# Patient Record
Sex: FEMALE | Race: WHITE | NOT HISPANIC OR LATINO | Employment: UNEMPLOYED | URBAN - METROPOLITAN AREA
[De-identification: names, ages, dates, MRNs, and addresses within clinical notes are randomized per-mention and may not be internally consistent; named-entity substitution may affect disease eponyms.]

---

## 2017-04-18 ENCOUNTER — HISTORICAL (OUTPATIENT)
Dept: ADMINISTRATIVE | Facility: HOSPITAL | Age: 72
End: 2017-04-18

## 2017-04-18 LAB
ALBUMIN SERPL-MCNC: 4.4 G/DL (ref 3.1–4.7)
ALP SERPL-CCNC: 64 IU/L (ref 40–104)
ALT (SGPT): 18 IU/L (ref 3–33)
AST SERPL-CCNC: 33 IU/L (ref 10–40)
BASOPHILS NFR BLD: 0 K/UL (ref 0–0.2)
BASOPHILS NFR BLD: 0.7 %
BILIRUB SERPL-MCNC: 0.7 MG/DL (ref 0.3–1)
BUN SERPL-MCNC: 14 MG/DL (ref 8–20)
CALCIUM SERPL-MCNC: 10 MG/DL (ref 7.7–10.4)
CHLORIDE: 104 MMOL/L (ref 98–110)
CK SERPL-CCNC: 42 IU/L (ref 13–135)
CO2 SERPL-SCNC: 26.1 MMOL/L (ref 22.8–31.6)
CREATININE: 0.96 MG/DL (ref 0.6–1.4)
CRP SERPL-MCNC: 0.44 MG/DL (ref 0–1.4)
EOSINOPHIL NFR BLD: 0 %
EOSINOPHIL NFR BLD: 0 K/UL (ref 0–0.7)
ERYTHROCYTE [DISTWIDTH] IN BLOOD BY AUTOMATED COUNT: 13.2 % (ref 11.7–14.9)
GLUCOSE: 193 MG/DL (ref 70–99)
GRAN #: 4.7 K/UL (ref 1.4–6.5)
GRAN%: 83.4 %
HCT VFR BLD AUTO: 41.7 % (ref 36–48)
HGB BLD-MCNC: 13.3 G/DL (ref 12–15)
IMMATURE GRANS (ABS): 0 K/UL (ref 0–1)
IMMATURE GRANULOCYTES: 0.5 %
LYMPH #: 0.8 K/UL (ref 1.2–3.4)
LYMPH%: 14.3 %
MCH RBC QN AUTO: 28.4 PG (ref 25–35)
MCHC RBC AUTO-ENTMCNC: 31.9 G/DL (ref 31–36)
MCV RBC AUTO: 88.9 FL (ref 79–98)
MONO #: 0.1 K/UL (ref 0.1–0.6)
MONO%: 1.1 %
NUCLEATED RBCS: 0 %
PLATELET # BLD AUTO: 250 K/UL (ref 140–440)
PMV BLD AUTO: 10.5 FL (ref 8.8–12.7)
POTASSIUM SERPL-SCNC: 4.6 MMOL/L (ref 3.5–5)
PROT SERPL-MCNC: 7.2 G/DL (ref 6–8.2)
RBC # BLD AUTO: 4.69 M/UL (ref 3.5–5.5)
SODIUM: 138 MMOL/L (ref 134–144)
TSH SERPL DL<=0.005 MIU/L-ACNC: 2.43 ULU/ML (ref 0.3–5.6)
WBC # BLD AUTO: 5.6 K/UL (ref 5–10)

## 2017-06-19 PROBLEM — Z87.39 HISTORY OF POLYMYALGIA RHEUMATICA: Status: ACTIVE | Noted: 2017-06-19

## 2017-07-11 ENCOUNTER — OFFICE VISIT (OUTPATIENT)
Dept: RHEUMATOLOGY | Facility: CLINIC | Age: 72
End: 2017-07-11
Payer: MEDICARE

## 2017-07-11 VITALS
HEIGHT: 66 IN | BODY MASS INDEX: 24.91 KG/M2 | DIASTOLIC BLOOD PRESSURE: 60 MMHG | WEIGHT: 155 LBS | SYSTOLIC BLOOD PRESSURE: 128 MMHG

## 2017-07-11 DIAGNOSIS — M62.81 MUSCLE WEAKNESS: Primary | ICD-10-CM

## 2017-07-11 LAB
CK SERPL-CCNC: 42 IU/L (ref 13–135)
CRP SERPL-MCNC: 0.41 MG/DL (ref 0–1.4)

## 2017-07-11 PROCEDURE — 1125F AMNT PAIN NOTED PAIN PRSNT: CPT | Mod: ,,, | Performed by: INTERNAL MEDICINE

## 2017-07-11 PROCEDURE — 1159F MED LIST DOCD IN RCRD: CPT | Mod: ,,, | Performed by: INTERNAL MEDICINE

## 2017-07-11 PROCEDURE — 99213 OFFICE O/P EST LOW 20 MIN: CPT | Mod: ,,, | Performed by: INTERNAL MEDICINE

## 2017-07-11 NOTE — PROGRESS NOTES
[facility] RHEUMATOLOGY            PROGRESS NOTE      Subjective:       Patient ID:   NAME: Lyn Rapp : 1945     72 y.o. female    Referring Doc: No ref. provider found  Other Physicians:    Chief Complaint:  polymyalgia rheumatica (follow-up- pt. sts, very lathargic, weak, no energy, having trouble getting up out of the chair when she sits)      History of Present Illness:     Patient returns today for a regularly scheduled follow-up visit for Polymyalgia rheumatica      The patient was treated for this condition and has been off steroids for many many months.  She denies any muscle pain but finds she has trouble getting out of chairs because of weakness primarily in the lower extremities ( proximally.)  She doesn't perceive any muscle weakness in the upper extremities. No paresthesias mild-to-moderate fatigue. No headaches, scalp tenderness or visual complaints.  No rashes, no joint swelling, no fevers, no significant weight loss  No gastrointestinal complaints            ROS:   GEN:  No  fever, night sweats . weight is stable   + fatigue  SKIN: no rashes, no bruising, no ulcerations, no Raynaud's  HEENT: no HA's, No visual changes, no mucosal ulcers, no sicca symptoms,  CV:   no CP, SOB, PND, OLIVEROS, no orthopnea, no palpitations  PULM: normal with no SOB, cough, hemoptysis, sputum or pleuritic pain  GI:  no abdominal pain, nausea, vomiting, constipation, diarrhea, melanotic stools, BRBPR, hematemesis, no dysphagia  :   no dysuria  NEURO: no paresthesias, headaches, visual disturbances,+ muscle weakness  MUSCULOSKELETAL:no joint swelling, prolonged AM stiffness, no back pain, no muscle pain  Allergies:  Review of patient's allergies indicates:   Allergen Reactions    Ciprofloxacin Hives and Swelling     Swelling in throat    Bactrim [sulfamethoxazole-trimethoprim] Rash    Sulfur Hives    Toprol xl [metoprolol succinate] Other (See Comments)     Profuse sweating       Medications:    Current  "Outpatient Prescriptions:     aspirin (ECOTRIN) 81 MG EC tablet, Take 81 mg by mouth once daily., Disp: , Rfl:     CALCIUM CARBONATE (CALCIUM 500 ORAL), Take 1,200 mg by mouth once daily., Disp: , Rfl:     cholecalciferol, vitamin D3, (VITAMIN D3) 2,000 unit Cap, Take 1 capsule by mouth once daily., Disp: , Rfl:     cloNIDine (CATAPRES) 0.2 MG tablet, TAKE ONE TABLET BY MOUTH THREE TIMES DAILY, Disp: 270 tablet, Rfl: 1    escitalopram oxalate (LEXAPRO) 20 MG tablet, TAKE ONE TABLET BY MOUTH ONCE DAILY, Disp: 90 tablet, Rfl: 3    fluticasone (FLONASE) 50 mcg/actuation nasal spray, 1 spray by Nasal route as needed. , Disp: , Rfl: 4    furosemide (LASIX) 20 MG tablet, TAKE 1 TABLET(20 MG) BY MOUTH EVERY DAY (Patient taking differently: TAKE 1 TABLET(20 MG) BY MOUTH EVERY DAY prn), Disp: 30 tablet, Rfl: 0    PMHx/PSHx Updates:    Objective:     Vitals:  Blood pressure 128/60, height 5' 6" (1.676 m), weight 70.3 kg (155 lb).    Physical Examination:   GEN: no apparent distress, comfortable; AAOx3  SKIN: no rashes,no ulceration, no Raynaud's, no petechiae, no SQ nodules,  HEAD: normal.No scalp tenderness, no temporal artery tenderness or nodularities  EYES: no pallor, no icterus  ENT:  ,no mucosal dryness or ulcerations  NECK: no masses, thyroid normal, trachea midline, no LAD/LN's, supple  CV: RRR with no murmur; l S1 and S2 reg. ,no gallop no rubs,   CHEST: Normal respiratory effort; CTAB; normal breath sounds; no wheeze or crackles  ABDOM: nontender and nondistended; soft; no masses; no rebound/guarding  MUSC/Skeletal: ROM normal; no crepitus; joints without synovitis,  no deformities  No joint swelling or tenderness of PIP, MCP, wrist, elbow, shoulder, or knee joints  EXTREM: no clubbing, cyanosis, no edema,normal  pulses   NEURO: grossly intact; motor WNL; AAOx3; , PSYCH: normal mood, affect and behavior  LYMPH: normal cervical, supraclavicular          Labs:   Lab Results   Component Value Date    WBC 5.6 " 04/18/2017    HGB 13.3 04/18/2017    HCT 41.7 04/18/2017    MCV 88.9 04/18/2017     04/18/2017    CMP  @LASTLAB(NA,K,CL,CO2,GLU,BUN,Creatinine,Calcium,PROT,Albumin,Bilitot,Alkphos,AST,ALT,CRP,ESR,RF,CCP,ANYA,SSA,CPK,uric acid) )@  I have reviewed all available lab results and radiology reports.    Radiology/Diagnostic Studies:        Assessment/Plan:   (1) 72 y.o. female with diagnosis of Polymyalgia rheumatica which seems to be stable.  Last inflammatory markers were done 3 months ago and they were within normal range. Thyroid function studies were also normal.  Patient is going to physical therapy trying to build up her strength but she doesn't feel she is making significant progress.    PLAN: Will repeat muscle enzyme tests, inflammatory  Markers; if they are normal she might benefit from seeing a neurologist and possibly do an EMG.  Follow-up in a few months              Discussion:     I have explained all of the above in detail and the patient understands all of the current recommendation(s). I have answered all questions to the best of my ability and to their complete satisfaction.       The patient is to continue with the current management plan         RTC in 3 months        Electronically signed by Jigar Martínez MD

## 2017-07-13 LAB
ALDOLASE SERPL-CCNC: 4.4 U/L (ref 3.3–10.3)
ANA SER-ACNC: NEGATIVE

## 2018-04-27 ENCOUNTER — HOSPITAL ENCOUNTER (INPATIENT)
Facility: HOSPITAL | Age: 73
LOS: 5 days | Discharge: HOME | DRG: 064 | End: 2018-05-02
Attending: EMERGENCY MEDICINE | Admitting: PSYCHIATRY & NEUROLOGY
Payer: MEDICARE

## 2018-04-27 DIAGNOSIS — I48.91 ATRIAL FIBRILLATION WITH RAPID VENTRICULAR RESPONSE: ICD-10-CM

## 2018-04-27 DIAGNOSIS — I48.0 AF (PAROXYSMAL ATRIAL FIBRILLATION): ICD-10-CM

## 2018-04-27 DIAGNOSIS — I63.412 EMBOLIC STROKE INVOLVING LEFT MIDDLE CEREBRAL ARTERY: Primary | ICD-10-CM

## 2018-04-27 DIAGNOSIS — I63.9 STROKE: ICD-10-CM

## 2018-04-27 PROBLEM — Z92.82 S/P ADMN TPA IN DIFF FAC W/N LAST 24 HR BEF ADM TO CRNT FAC: Status: ACTIVE | Noted: 2018-04-27

## 2018-04-27 LAB
BASOPHILS # BLD AUTO: 0.08 K/UL
BASOPHILS NFR BLD: 0.7 %
CREAT SERPL-MCNC: 0.8 MG/DL (ref 0.5–1.4)
DIFFERENTIAL METHOD: ABNORMAL
EOSINOPHIL # BLD AUTO: 0.1 K/UL
EOSINOPHIL NFR BLD: 1.1 %
ERYTHROCYTE [DISTWIDTH] IN BLOOD BY AUTOMATED COUNT: 14.3 %
HCT VFR BLD AUTO: 37.5 %
HGB BLD-MCNC: 12.2 G/DL
IMM GRANULOCYTES # BLD AUTO: 0.1 K/UL
IMM GRANULOCYTES NFR BLD AUTO: 0.9 %
LYMPHOCYTES # BLD AUTO: 3 K/UL
LYMPHOCYTES NFR BLD: 27.8 %
MCH RBC QN AUTO: 28.6 PG
MCHC RBC AUTO-ENTMCNC: 32.5 G/DL
MCV RBC AUTO: 88 FL
MONOCYTES # BLD AUTO: 1 K/UL
MONOCYTES NFR BLD: 9.2 %
NEUTROPHILS # BLD AUTO: 6.6 K/UL
NEUTROPHILS NFR BLD: 60.3 %
NRBC BLD-RTO: 0 /100 WBC
PLATELET # BLD AUTO: 325 K/UL
PMV BLD AUTO: 10 FL
POC PTINR: 1.1 (ref 0.9–1.2)
POC PTWBT: 12.9 SEC (ref 9.7–14.3)
RBC # BLD AUTO: 4.26 M/UL
SAMPLE: NORMAL
SAMPLE: NORMAL
WBC # BLD AUTO: 10.93 K/UL

## 2018-04-27 PROCEDURE — 80053 COMPREHEN METABOLIC PANEL: CPT | Mod: 91

## 2018-04-27 PROCEDURE — 99233 SBSQ HOSP IP/OBS HIGH 50: CPT | Mod: GC,,, | Performed by: PSYCHIATRY & NEUROLOGY

## 2018-04-27 PROCEDURE — 82565 ASSAY OF CREATININE: CPT

## 2018-04-27 PROCEDURE — 80061 LIPID PANEL: CPT

## 2018-04-27 PROCEDURE — 85730 THROMBOPLASTIN TIME PARTIAL: CPT | Mod: 91

## 2018-04-27 PROCEDURE — 93010 ELECTROCARDIOGRAM REPORT: CPT | Mod: ,,, | Performed by: INTERNAL MEDICINE

## 2018-04-27 PROCEDURE — 99900035 HC TECH TIME PER 15 MIN (STAT)

## 2018-04-27 PROCEDURE — 85025 COMPLETE CBC W/AUTO DIFF WBC: CPT | Mod: 91

## 2018-04-27 PROCEDURE — 99291 CRITICAL CARE FIRST HOUR: CPT | Mod: ,,, | Performed by: EMERGENCY MEDICINE

## 2018-04-27 PROCEDURE — 86850 RBC ANTIBODY SCREEN: CPT

## 2018-04-27 PROCEDURE — 12000002 HC ACUTE/MED SURGE SEMI-PRIVATE ROOM

## 2018-04-27 PROCEDURE — 96374 THER/PROPH/DIAG INJ IV PUSH: CPT

## 2018-04-27 PROCEDURE — 93005 ELECTROCARDIOGRAM TRACING: CPT

## 2018-04-27 PROCEDURE — 85610 PROTHROMBIN TIME: CPT

## 2018-04-27 PROCEDURE — 85610 PROTHROMBIN TIME: CPT | Mod: 91

## 2018-04-27 PROCEDURE — 99291 CRITICAL CARE FIRST HOUR: CPT | Mod: 25

## 2018-04-27 PROCEDURE — 84443 ASSAY THYROID STIM HORMONE: CPT

## 2018-04-27 RX ORDER — LANOLIN ALCOHOL/MO/W.PET/CERES
800 CREAM (GRAM) TOPICAL
Status: DISCONTINUED | OUTPATIENT
Start: 2018-04-28 | End: 2018-04-30

## 2018-04-27 RX ORDER — HYDRALAZINE HYDROCHLORIDE 20 MG/ML
10 INJECTION INTRAMUSCULAR; INTRAVENOUS EVERY 4 HOURS PRN
Status: DISCONTINUED | OUTPATIENT
Start: 2018-04-28 | End: 2018-05-02 | Stop reason: HOSPADM

## 2018-04-27 RX ORDER — LABETALOL HYDROCHLORIDE 5 MG/ML
INJECTION, SOLUTION INTRAVENOUS
Status: DISPENSED
Start: 2018-04-27 | End: 2018-04-28

## 2018-04-27 RX ORDER — POTASSIUM CHLORIDE 20 MEQ/15ML
40 SOLUTION ORAL
Status: DISCONTINUED | OUTPATIENT
Start: 2018-04-28 | End: 2018-04-30

## 2018-04-27 RX ORDER — SODIUM CHLORIDE 0.9 % (FLUSH) 0.9 %
3 SYRINGE (ML) INJECTION EVERY 8 HOURS
Status: DISCONTINUED | OUTPATIENT
Start: 2018-04-28 | End: 2018-05-02 | Stop reason: HOSPADM

## 2018-04-27 RX ORDER — LABETALOL HYDROCHLORIDE 5 MG/ML
10 INJECTION, SOLUTION INTRAVENOUS EVERY 30 MIN PRN
Status: DISCONTINUED | OUTPATIENT
Start: 2018-04-28 | End: 2018-05-02 | Stop reason: HOSPADM

## 2018-04-27 RX ORDER — INSULIN ASPART 100 [IU]/ML
0-5 INJECTION, SOLUTION INTRAVENOUS; SUBCUTANEOUS EVERY 6 HOURS PRN
Status: DISCONTINUED | OUTPATIENT
Start: 2018-04-28 | End: 2018-05-02 | Stop reason: HOSPADM

## 2018-04-27 RX ORDER — POLYETHYLENE GLYCOL 3350 17 G/17G
17 POWDER, FOR SOLUTION ORAL DAILY PRN
Status: DISCONTINUED | OUTPATIENT
Start: 2018-04-28 | End: 2018-05-02 | Stop reason: HOSPADM

## 2018-04-27 RX ORDER — SODIUM,POTASSIUM PHOSPHATES 280-250MG
2 POWDER IN PACKET (EA) ORAL
Status: DISCONTINUED | OUTPATIENT
Start: 2018-04-28 | End: 2018-04-30

## 2018-04-27 RX ORDER — ACETAMINOPHEN 325 MG/1
650 TABLET ORAL EVERY 6 HOURS PRN
Status: DISCONTINUED | OUTPATIENT
Start: 2018-04-28 | End: 2018-04-28

## 2018-04-27 RX ORDER — GLUCAGON 1 MG
1 KIT INJECTION
Status: DISCONTINUED | OUTPATIENT
Start: 2018-04-28 | End: 2018-05-02 | Stop reason: HOSPADM

## 2018-04-27 RX ORDER — POTASSIUM CHLORIDE 20 MEQ/15ML
60 SOLUTION ORAL
Status: DISCONTINUED | OUTPATIENT
Start: 2018-04-28 | End: 2018-04-30

## 2018-04-27 RX ORDER — ATORVASTATIN CALCIUM 20 MG/1
40 TABLET, FILM COATED ORAL DAILY
Status: DISCONTINUED | OUTPATIENT
Start: 2018-04-28 | End: 2018-05-02

## 2018-04-27 RX ADMIN — LABETALOL HYDROCHLORIDE 10 MG: 5 INJECTION, SOLUTION INTRAVENOUS at 11:04

## 2018-04-27 RX ADMIN — IOHEXOL 100 ML: 350 INJECTION, SOLUTION INTRAVENOUS at 11:04

## 2018-04-28 LAB
ABO + RH BLD: NORMAL
ALBUMIN SERPL BCP-MCNC: 2.9 G/DL
ALBUMIN SERPL BCP-MCNC: 3.2 G/DL
ALP SERPL-CCNC: 53 U/L
ALP SERPL-CCNC: 57 U/L
ALT SERPL W/O P-5'-P-CCNC: 11 U/L
ALT SERPL W/O P-5'-P-CCNC: 13 U/L
ANION GAP SERPL CALC-SCNC: 10 MMOL/L
ANION GAP SERPL CALC-SCNC: 12 MMOL/L
APTT BLDCRRT: <21 SEC
AST SERPL-CCNC: 20 U/L
AST SERPL-CCNC: 22 U/L
BASOPHILS # BLD AUTO: 0.07 K/UL
BASOPHILS NFR BLD: 0.8 %
BILIRUB SERPL-MCNC: 0.3 MG/DL
BILIRUB SERPL-MCNC: 0.4 MG/DL
BLD GP AB SCN CELLS X3 SERPL QL: NORMAL
BUN SERPL-MCNC: 15 MG/DL
BUN SERPL-MCNC: 15 MG/DL
CALCIUM SERPL-MCNC: 9.1 MG/DL
CALCIUM SERPL-MCNC: 9.4 MG/DL
CHLORIDE SERPL-SCNC: 107 MMOL/L
CHLORIDE SERPL-SCNC: 107 MMOL/L
CHOLEST SERPL-MCNC: 124 MG/DL
CHOLEST/HDLC SERPL: 2.9 {RATIO}
CO2 SERPL-SCNC: 19 MMOL/L
CO2 SERPL-SCNC: 20 MMOL/L
CREAT SERPL-MCNC: 0.8 MG/DL
CREAT SERPL-MCNC: 0.9 MG/DL
DIFFERENTIAL METHOD: NORMAL
EOSINOPHIL # BLD AUTO: 0.1 K/UL
EOSINOPHIL NFR BLD: 1 %
ERYTHROCYTE [DISTWIDTH] IN BLOOD BY AUTOMATED COUNT: 14.4 %
EST. GFR  (AFRICAN AMERICAN): >60 ML/MIN/1.73 M^2
EST. GFR  (AFRICAN AMERICAN): >60 ML/MIN/1.73 M^2
EST. GFR  (NON AFRICAN AMERICAN): >60 ML/MIN/1.73 M^2
EST. GFR  (NON AFRICAN AMERICAN): >60 ML/MIN/1.73 M^2
ESTIMATED AVG GLUCOSE: 105 MG/DL
ESTIMATED PA SYSTOLIC PRESSURE: 27.8
GLUCOSE SERPL-MCNC: 108 MG/DL
GLUCOSE SERPL-MCNC: 131 MG/DL
HBA1C MFR BLD HPLC: 5.3 %
HCT VFR BLD AUTO: 37.9 %
HDLC SERPL-MCNC: 43 MG/DL
HDLC SERPL: 34.7 %
HGB BLD-MCNC: 12.3 G/DL
IMM GRANULOCYTES # BLD AUTO: 0.04 K/UL
IMM GRANULOCYTES NFR BLD AUTO: 0.5 %
INR PPP: 1.2
LDLC SERPL CALC-MCNC: 58.6 MG/DL
LYMPHOCYTES # BLD AUTO: 2.6 K/UL
LYMPHOCYTES NFR BLD: 28.8 %
MAGNESIUM SERPL-MCNC: 2 MG/DL
MCH RBC QN AUTO: 28.1 PG
MCHC RBC AUTO-ENTMCNC: 32.5 G/DL
MCV RBC AUTO: 87 FL
MITRAL VALVE MOBILITY: NORMAL
MITRAL VALVE REGURGITATION: NORMAL
MONOCYTES # BLD AUTO: 0.7 K/UL
MONOCYTES NFR BLD: 8.4 %
NEUTROPHILS # BLD AUTO: 5.4 K/UL
NEUTROPHILS NFR BLD: 60.5 %
NONHDLC SERPL-MCNC: 81 MG/DL
NRBC BLD-RTO: 0 /100 WBC
PHOSPHATE SERPL-MCNC: 3.3 MG/DL
PLATELET # BLD AUTO: 310 K/UL
PMV BLD AUTO: 10 FL
POCT GLUCOSE: 102 MG/DL (ref 70–110)
POCT GLUCOSE: 102 MG/DL (ref 70–110)
POTASSIUM SERPL-SCNC: 3.9 MMOL/L
POTASSIUM SERPL-SCNC: 3.9 MMOL/L
PROT SERPL-MCNC: 5.7 G/DL
PROT SERPL-MCNC: 6.2 G/DL
PROTHROMBIN TIME: 12.1 SEC
RBC # BLD AUTO: 4.38 M/UL
RETIRED EF AND QEF - SEE NOTES: 48 (ref 55–65)
SODIUM SERPL-SCNC: 137 MMOL/L
SODIUM SERPL-SCNC: 138 MMOL/L
TRICUSPID VALVE REGURGITATION: NORMAL
TRIGL SERPL-MCNC: 112 MG/DL
TSH SERPL DL<=0.005 MIU/L-ACNC: 3.75 UIU/ML
WBC # BLD AUTO: 8.86 K/UL

## 2018-04-28 PROCEDURE — A4216 STERILE WATER/SALINE, 10 ML: HCPCS | Performed by: STUDENT IN AN ORGANIZED HEALTH CARE EDUCATION/TRAINING PROGRAM

## 2018-04-28 PROCEDURE — 92523 SPEECH SOUND LANG COMPREHEN: CPT

## 2018-04-28 PROCEDURE — 25000003 PHARM REV CODE 250: Performed by: PHYSICIAN ASSISTANT

## 2018-04-28 PROCEDURE — 84100 ASSAY OF PHOSPHORUS: CPT

## 2018-04-28 PROCEDURE — 25500020 PHARM REV CODE 255: Performed by: EMERGENCY MEDICINE

## 2018-04-28 PROCEDURE — 25000003 PHARM REV CODE 250: Performed by: ANESTHESIOLOGY

## 2018-04-28 PROCEDURE — G8997 SWALLOW GOAL STATUS: HCPCS | Mod: CH

## 2018-04-28 PROCEDURE — G8980 MOBILITY D/C STATUS: HCPCS | Mod: CH

## 2018-04-28 PROCEDURE — 99233 SBSQ HOSP IP/OBS HIGH 50: CPT | Mod: GC,,, | Performed by: PSYCHIATRY & NEUROLOGY

## 2018-04-28 PROCEDURE — 80053 COMPREHEN METABOLIC PANEL: CPT

## 2018-04-28 PROCEDURE — 93306 TTE W/DOPPLER COMPLETE: CPT

## 2018-04-28 PROCEDURE — 20000000 HC ICU ROOM

## 2018-04-28 PROCEDURE — 85025 COMPLETE CBC W/AUTO DIFF WBC: CPT

## 2018-04-28 PROCEDURE — G8978 MOBILITY CURRENT STATUS: HCPCS | Mod: CH

## 2018-04-28 PROCEDURE — G8998 SWALLOW D/C STATUS: HCPCS | Mod: CH

## 2018-04-28 PROCEDURE — 93306 TTE W/DOPPLER COMPLETE: CPT | Mod: 26,,, | Performed by: INTERNAL MEDICINE

## 2018-04-28 PROCEDURE — 25000003 PHARM REV CODE 250: Performed by: STUDENT IN AN ORGANIZED HEALTH CARE EDUCATION/TRAINING PROGRAM

## 2018-04-28 PROCEDURE — 83036 HEMOGLOBIN GLYCOSYLATED A1C: CPT

## 2018-04-28 PROCEDURE — 92610 EVALUATE SWALLOWING FUNCTION: CPT

## 2018-04-28 PROCEDURE — 97162 PT EVAL MOD COMPLEX 30 MIN: CPT

## 2018-04-28 PROCEDURE — 97802 MEDICAL NUTRITION INDIV IN: CPT

## 2018-04-28 PROCEDURE — G8979 MOBILITY GOAL STATUS: HCPCS | Mod: CH

## 2018-04-28 PROCEDURE — G8996 SWALLOW CURRENT STATUS: HCPCS | Mod: CH

## 2018-04-28 PROCEDURE — 99223 1ST HOSP IP/OBS HIGH 75: CPT | Mod: ,,, | Performed by: ANESTHESIOLOGY

## 2018-04-28 PROCEDURE — 83735 ASSAY OF MAGNESIUM: CPT

## 2018-04-28 PROCEDURE — 63600175 PHARM REV CODE 636 W HCPCS: Performed by: STUDENT IN AN ORGANIZED HEALTH CARE EDUCATION/TRAINING PROGRAM

## 2018-04-28 PROCEDURE — 94761 N-INVAS EAR/PLS OXIMETRY MLT: CPT

## 2018-04-28 RX ORDER — ACETAMINOPHEN 325 MG/1
650 TABLET ORAL EVERY 6 HOURS PRN
Status: DISCONTINUED | OUTPATIENT
Start: 2018-04-28 | End: 2018-04-30

## 2018-04-28 RX ORDER — ONDANSETRON 2 MG/ML
4 INJECTION INTRAMUSCULAR; INTRAVENOUS EVERY 8 HOURS PRN
Status: DISCONTINUED | OUTPATIENT
Start: 2018-04-28 | End: 2018-04-29

## 2018-04-28 RX ORDER — SODIUM CHLORIDE 9 MG/ML
INJECTION, SOLUTION INTRAVENOUS CONTINUOUS
Status: DISCONTINUED | OUTPATIENT
Start: 2018-04-28 | End: 2018-04-29

## 2018-04-28 RX ORDER — ATENOLOL 25 MG/1
25 TABLET ORAL DAILY
Status: DISCONTINUED | OUTPATIENT
Start: 2018-04-28 | End: 2018-04-30

## 2018-04-28 RX ADMIN — ACETAMINOPHEN 650 MG: 325 TABLET ORAL at 07:04

## 2018-04-28 RX ADMIN — HYDRALAZINE HYDROCHLORIDE 10 MG: 20 INJECTION INTRAMUSCULAR; INTRAVENOUS at 12:04

## 2018-04-28 RX ADMIN — SODIUM CHLORIDE: 0.9 INJECTION, SOLUTION INTRAVENOUS at 03:04

## 2018-04-28 RX ADMIN — ATENOLOL 25 MG: 25 TABLET ORAL at 12:04

## 2018-04-28 RX ADMIN — Medication 3 ML: at 01:04

## 2018-04-28 RX ADMIN — ACETAMINOPHEN 650 MG: 325 TABLET ORAL at 02:04

## 2018-04-28 RX ADMIN — SODIUM CHLORIDE: 0.9 INJECTION, SOLUTION INTRAVENOUS at 10:04

## 2018-04-28 NOTE — PROGRESS NOTES
Patient arrived to [facility] from [facility] to [facility] ED to [facility].    Type of Stroke: Embolic LMCA    TPA start time and end time: 4.27.18 at 2202.    Patients current symptoms include: expressive aphasia on certain words. Initial GCS 15 and NIH 0.     ANJEL Oscar notified patient arrived form ED.

## 2018-04-28 NOTE — PT/OT/SLP EVAL
"Speech Language Pathology Evaluation  Cognitive/Bedside Swallow    Patient Name:  Lyn Rapp   MRN:  [id number]  Admitting Diagnosis: <principal problem not specified>    Recommendations:                  General Recommendations:  Speech/language therapy and Cognitive-linguistic therapy  Diet recommendations:  Regular, Thin   Aspiration Precautions: Standard aspiration precautions   General Precautions: Standard, aphasia, aspiration, fall  Communication strategies:  provide increased time to answer    History:     Past Medical History:   Diagnosis Date    Acid reflux     Coronary artery disease due to calcified coronary lesion 7/13/2016    nonobstructive    Hypertension     Melanoma 1994    Melanoma of back     Mitral valve prolapse        Past Surgical History:   Procedure Laterality Date    APPENDECTOMY      APPENDECTOMY      BREAST BIOPSY Right     benign excisional biopsy    CARDIAC CATHETERIZATION      CHOLECYSTECTOMY      COLONOSCOPY      HYSTERECTOMY  1982    MANDIBLE SURGERY      OOPHORECTOMY Bilateral 1982    TONSILLECTOMY      TUBAL LIGATION         Social History: Patient lives with . 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  Mild difficulty noted with immediate verbal recall; however, suspect in part due to language difficulty.  Pt recalled up to 4 digits and 3 out of 4 words.   Problem Solving - Answers to hypothetical questions were accurate on 75% of trials.  Pt completed categorization with 50% ind'ly/75% given cuing.  Pt was able to compare/contrast two given items.  She sequenced " 3 out of 4 words ind'ly and 4/4 given repetition of words.  Again, language deficits likely contributing to difficulty with problem solving tasks at this time.      Receptive Language:   Comprehension:   WFL for complex commands and complex y/n .    Pragmatics:    WFL    Expressive Language:  Verbal:    Mild deficits noted.  Pt responded to spontaneous speech questions with 100%.  She stated months of the year ind'ly.  Responsive naming was completed with 100% and pt named common objects with 100% accuracy.  Ms. Rapp listed 12 items in one minute on a word fluency task, when 15-20 is considered wnl.  Word finding difficulty was noted at times in conversation and with more complex language tasks. At times difficulty was c/b mild hesitation or dysfluency.  Given time, pt generally able to elicit word.    Motor Speech:  No significant dysarthria noted.    Voice:   WFL    Visual-Spatial:  To be tested.    Reading:   WFL for sign in room.    Written Expression:   WFL at sentence level.     Oral Musculature Evaluation  · Oral Musculature: WFL  · Dentition: present and adequate  · Mucosal Quality: good  · Mandibular Strength and Mobility: WFL  · Oral Labial Strength and Mobility: WFL  · Lingual Strength and Mobility: WFL  · Volitional Cough: good  · Voice Prior to PO Intake: clear    Bedside Swallow Eval:   Consistencies Assessed:  · Thin liquids (cup/straw - 4 oz)  · Solids (crackers)     Oral Phase:   · WFL    Pharyngeal Phase:   · no overt clinical signs/symptoms of aspiration    Treatment: Education provided regarding ST role, language deficits, aspiration precautions and plan of care.  Word finding strategies were reviewed.  All questions were addressed.    Pt leaving ED for floor upon completion of evaluation.  Results reviewed with ED nurse.    Assessment:     Lyn Rapp is a 73 y.o. female with an SLP diagnosis of Aphasia and Cognitive-Linguistic Impairment.    Goals:    SLP Goals        Problem: SLP  Goal    Goal Priority Disciplines Outcome   SLP Goal     SLP    Description:  Speech Language Pathology Goals  Goals expected to be met by 5/5  1. Pt will participate in further assessment of visual-spatial skills.   2. Pt will complete moderate level word finding tasks with 80% accuracy, to improve expressive language skills.   3.  Pt will list 15 items in one minute on a word fluency task, to improve word fluency/thought organization skills.   4.  Pt will complete categorization tasks with 90% accuracy, to improve expressive language skills.   5.  Further assess higher level cognitive skills including math/time/basic money/deduction to determine if further intervention is warranted.  6. Pt will tolerate regular diet with thin liquids and no overt s/s of aspiration.                        Plan:     · Patient to be seen:  5 x/week   · Plan of Care expires:  05/27/18  · Plan of Care reviewed with:  patient, family   · SLP Follow-Up:  Yes       Discharge recommendations:  Discharge Facility/Level Of Care Needs: outpatient speech therapy     Time Tracking:     SLP Treatment Date:   04/28/18  Speech Start Time:  0950  Speech Stop Time:  1020     Speech Total Time (min):  30 min    Billable Minutes: Eval 20  and Eval Swallow and Oral Function 10    SAMANTHA Rizvi, CCC-SLP  Speech Language Pathologist  [phone]  4/28/2018

## 2018-04-28 NOTE — ASSESSMENT & PLAN NOTE
73 y.o. female with significant past medical history of GERD, HTN, CAD, Afib, and diverticulitis presented to hospital as a transfer from [facility] ED for evaluation of L MCA stroke symptoms.  Patient received tPA.  CTA MP negative for LVO. Source is likely thromboembolic as patient has A Fib and stopped taking coumadin about 3 weeks ago.    MRI ordered for today. Patient has mild expressive aphasia. Right UE and LE strength has improved    Antithrombotics for secondary stroke prevention: Antiplatelets: None: Hold all Antithrombotics x 24 hours after IV t-PA administration    Statins for secondary stroke prevention and hyperlipidemia, if present:   Statins: Atorvastatin- 40 mg daily    Aggressive risk factor modification: HTN, A-Fib     Rehab efforts: PT/OT/SLP to evaluate and treat    Diagnostics ordered/pending: MRI head without contrast to assess brain parenchyma, TTE to assess cardiac function/status     VTE prophylaxis: None: Reason for No Pharmacological VTE Prophylaxis: Mechanical prophylaxis: Place SCDs, hold anticoagulation for 24hrs post tPA administration.      BP parameters: Infarct: Post tPA, SBP <180

## 2018-04-28 NOTE — SUBJECTIVE & OBJECTIVE
Neurologic Chief Complaint: aphasia, right hemiparesis    Subjective:     Interval History: Patient is seen for follow-up neurological assessment and treatment recommendations:     Aphasia improved overnight. Patient with mild residual expressive. Improvement in right sided strength. HR in 120s. Holding anti-coagulation due to recent tPA    HPI, Past Medical, Family, and Social History remains the same as documented in the initial encounter.     Review of Systems   Constitutional: Negative for chills and fever.   HENT: Negative for drooling and trouble swallowing.    Eyes: Negative for redness and visual disturbance.   Respiratory: Negative for cough and shortness of breath.    Cardiovascular: Negative for chest pain and palpitations.   Gastrointestinal: Negative for abdominal pain, nausea and vomiting.   Endocrine: Negative for cold intolerance and heat intolerance.   Genitourinary: Negative for dysuria and frequency.   Musculoskeletal: Negative for neck pain and neck stiffness.   Skin: Negative for rash and wound.   Allergic/Immunologic: Negative for environmental allergies and food allergies.   Neurological: Positive for facial asymmetry, speech difficulty, weakness and headaches. Negative for dizziness and numbness.   Hematological: Negative for adenopathy. Does not bruise/bleed easily.   Psychiatric/Behavioral: Negative for agitation.     Scheduled Meds:   atenolol  25 mg Oral Daily    atorvastatin  40 mg Oral Daily    sodium chloride 0.9%  3 mL Intravenous Q8H     Continuous Infusions:   sodium chloride 0.9% 75 mL/hr at 04/28/18 1400     PRN Meds:acetaminophen, dextrose 50%, glucagon (human recombinant), hydrALAZINE, insulin aspart U-100, labetalol, magnesium oxide, magnesium oxide, polyethylene glycol, potassium chloride 10%, potassium chloride 10%, potassium chloride 10%, potassium, sodium phosphates, potassium, sodium phosphates, potassium, sodium phosphates, promethazine (PHENERGAN) IVPB    Objective:      Vital Signs (Most Recent):  Temp: 98.3 °F (36.8 °C) (04/28/18 1102)  Pulse: 107 (04/28/18 1402)  Resp: 19 (04/28/18 1402)  BP: (!) 142/68 (04/28/18 1402)  SpO2: 95 % (04/28/18 1402)  BP Location: Left arm    Vital Signs Range (Last 24H):  Temp:  [98.2 °F (36.8 °C)-98.9 °F (37.2 °C)]   Pulse:  []   Resp:  [14-28]   BP: (114-189)/()   SpO2:  [94 %-97 %]   BP Location: Left arm    Physical Exam   Constitutional: She is oriented to person, place, and time. She appears well-developed and well-nourished. No distress.   HENT:   Head: Normocephalic and atraumatic.   Right Ear: External ear normal.   Left Ear: External ear normal.   Nose: Nose normal.   Mouth/Throat: Oropharynx is clear and moist. No oropharyngeal exudate.   Eyes: Conjunctivae and EOM are normal. Pupils are equal, round, and reactive to light. Right eye exhibits no discharge. Left eye exhibits no discharge. No scleral icterus.   Neck: Normal range of motion. Neck supple. No thyromegaly present.   Cardiovascular: Normal rate, regular rhythm and normal heart sounds.    No murmur heard.  Pulmonary/Chest: Effort normal and breath sounds normal. No respiratory distress. She has no wheezes. She has no rhonchi. She has no rales.   Abdominal: Soft. Bowel sounds are normal. She exhibits no distension. There is no hepatosplenomegaly. There is no tenderness.   Musculoskeletal: She exhibits no edema.   Lymphadenopathy:     She has no cervical adenopathy.   Neurological: She is alert and oriented to person, place, and time.   Skin: Skin is warm and dry. Capillary refill takes less than 2 seconds. She is not diaphoretic.   Psychiatric: She has a normal mood and affect.   Nursing note and vitals reviewed.      Neurological Exam:   LOC: alert  Attention Span: Good   Language: Expressive aphasia  Articulation: No dysarthria  Orientation: Person, Place, Time   Visual Fields: Full  EOM (CN III, IV, VI): Full/intact  Pupils (CN II, III): PERRL  Facial Sensation  (CN V): Normal  Facial Movement (CN VII): Symmetric facial expression    Motor: Arm left  Normal 5/5  Leg left  Normal 5/5  Arm right  Normal 5/5  Leg right Paresis: 4/5  Cebellar: No evidence of appendicular or axial ataxia  Sensation: Intact to light touch, temperature and vibration  Tone: Normal tone throughout    Laboratory:  CMP:   Recent Labs  Lab 04/28/18  0358   CALCIUM 9.4   ALBUMIN 3.2*   PROT 6.2      K 3.9   CO2 19*      BUN 15   CREATININE 0.9   ALKPHOS 57   ALT 13   AST 22   BILITOT 0.4     CBC:   Recent Labs  Lab 04/28/18  0358   WBC 8.86   RBC 4.38   HGB 12.3   HCT 37.9      MCV 87   MCH 28.1   MCHC 32.5     Coagulation:   Recent Labs  Lab 04/27/18  2328   INR 1.2   APTT <21.0     TSH:   Recent Labs  Lab 04/27/18  2328   TSH 3.750       Diagnostic Results     Brain Imaging   CTH 4/27  No acute intracranial abnormality. Chronic changes     CTA head/neck 4/27  No acute abnormality. No high-grade stenosis or major vessel occlusion.    MRI brain pending     Vessel Imaging   CTA head/neck 4/27  No acute abnormality. No high-grade stenosis or major vessel occlusion.    Cardiac Imaging   2D echo 4/28    1 - Mildly depressed left ventricular systolic function (EF 45-50%).     2 - Concentric remodeling.     3 - Wall motion abnormalities.     4 - Low normal right ventricular systolic function .     5 - Mild to moderate mitral regurgitation.     6 - Trivial tricuspid regurgitation.     7 - The estimated PA systolic pressure is 28 mmHg.

## 2018-04-28 NOTE — ASSESSMENT & PLAN NOTE
- Stroke risk factor.  Patient newly diagnosed w/Afib.  Reportedly stopped taking coumadin about 3 weeks ago due to concerns about bleeding  - source of stroke was likely thromboembolic   - Recommend rate control with beta blockers (currently on atenolol. Consider metoprolol)  - Hold anticoagulation for 24 hrs post tPA administration.  Will need to resume coumadin at discharge

## 2018-04-28 NOTE — HPI
Patient is a 73 y.o. female with significant past medical history of GERD, HTN, CAD, Afib, and diverticulitis presented to hospital as a transfer from [facility] ED for evaluation of L MCA stroke symptoms.  Patient was LKN 1930.  She acutely developed aphasia, with right sided weakness.  CTH at OSH negative for acute findings.  Telestroke consult performed by Dr. Tinoco.  tPA administered.  Transferred to [facility] for further evaluation/possible intervention.  On arrival patient aphasic.  She has no other complaints besides the urge to urinate.  The patient was taken to CT for CTA MP.  Asia Noble and Hussain reviewed images as acquired.  No LVO.  Patient not a candidate for IR intervention.  Will be admitted to [facility] for close monitoring post tPA administration.

## 2018-04-28 NOTE — HOSPITAL COURSE
Lyn Rapp is a 73 y.o. female with PMHx of GERD, HTN, CAD, Afib, and diverticulosis who presented to hospital as a transfer from [facility] ED for evaluation of L MCA stroke symptoms. CTH at OSH negative for acute findings.Telestroke consult performed by Dr. Tinoco and tPA was administered.  Patient was not eligible for a thrombectomy because CTA multiphase revealed no LVO. She was admitted to neuro ICU for further care post tPA. MRI revealed small L temporal parietal lobe infarct. Echo revealed an EF of 48%, LV wall motion abnormalities, and severe LA enlargement. Patient had recently stopped taking her coumadin due to no provider following her INR. Etiology of patient's stroke likely cardioembolic.     Patient remained stable in ICU and was stepped down on 04/29/18. She was evaluated by PT/OT and speech who recommended discharge home with no outpatient therapy needs. On day of step down, patient began developing LLQ and LUQ abdominal pain. Her symptoms progressed to N/V, bloody diarrhea, and fever. CT abdomen/pelvis revealed acute diverticulitis with small area of adjacent focal inflammatory change and air likely representing phlegmon with contained perforation. Patient was made NPO and started on IV cefepime and flagyl. She was treated symptomatically for pain and nausea/vomiting. Colorectal surgery was consulted for CT revealing phlegmon and contained perforation, no surgical intervention was required. Patient's N/V and abdominal pain began to improve. At discharge, she was still experiencing diarrhea, but was afebrile and tolerating a regular diet. Patient will continue an additional 7 days of oral antibiotics (cefdinir and flagyl) for complete treatment of her diverticulitis. She has had prolonged diarrhea, c diff study sample was taken on day of discharge. Stroke provider will follow up on results of stool sample. In addition, CT abdomen pelvis revealed enhancement that may represent  pyelonephritis. Patient's UA on admission negative. She denies dysuria and flank pain, patient already on antibiotics with adequate coverage.     For secondary stroke prevention, patient will restart coumadin and her INR will be followed by her PCP. She will not required a statin due to LDL <70, patient will follow up with PCP for further management of cholesterol. She will resume all home medications. Patient will be discharged home and follow up with her PCP, cardiologist, and stroke clinic.       4/27: Admitted to [facility] s/p tPA at OSH for L MCA stroke symptoms, likely embolic in origin. CTH and CTA head/neck showed no acute abnormality, no high-grade stenosis or major vessel occlusion  4/28: Aphasia improved overnight. Patient with mild residual expressive. Improvement in right sided strength. HR in 120s. Holding anti-coagulation due to recent tPA  4/29: No acute events. MRI brain showed small subacute left temporal parietal lobe infarct, no mass effect or intracranial hemorrhage. Further improvement in speech. Expressive aphasia has resolved. Right UE/LE strength improved. HR better controlled with atenolol (home med). Can re-start anticoagulation. Stepdown to floor  4/30: CT abdomen pelvis with acute diverticulitis and phlegmon with contained perforation, started on cefepime and flagyl, consulted colorectal surgery  5/1: starting clear liquid diet, seen by colorectal surgery and no intervention needed at this time  5/2: tolerated a clear liquid diet overnight, starting regular diet

## 2018-04-28 NOTE — ASSESSMENT & PLAN NOTE
-Stroke risk factor.  Patient newly diagnosed w/Afib.  Reportedly not currently on anticoagulation, though review of home meds reveals Coumadin was prescribed in January 2018.  -Hold anticoagulation for 24 hrs post tPA administration.  Will need evaluation for anticoagulation prior to discharge.

## 2018-04-28 NOTE — PROGRESS NOTES
RN notified ANJEL Oscar that the patient is in A. Fib RVR and that the patient has an allergy to Lopressor. RN will continue to monitor.

## 2018-04-28 NOTE — ED NOTES
LOC: The patient is awake, alert and aware of environment with an appropriate effect. Patient is having minimal difficulty with word choice.     APPEARANCE: Patient resting comfortably and in no acute distress, patient is resting in stretcher with no complaints at this time.     SKIN: The skin is warm and dry, skin intact, no breakdown or brusing noted.    MUSKULOSKELETAL: Patient is able to move all extremities well on command.  Patient is a high fall risk for she has status post TPA last pm.     RESPIRATORY: Airway is open and patent, respirations are spontaneous, patient has a normal effort and rate. Breath sounds are clear and equal bilaterally.    CARDIAC: Normal heart sounds. No peripheral edema. Pulses present in all extremities.     ABDOMEN: Soft and mildly tender to palpation, no distention noted. Patient suffers from divertuclitis and has just completed antibiotic therapy, report that patient did have very small amount of blood in stool and this is normal for the patient with a recent flare of divertuclitis.  Bowel sounds present.     NEURO: Hand grasp equal, no drift noted, no facial droop noted. Speech is clear, patient is having minimal difficulty with word choices.

## 2018-04-28 NOTE — ED PROVIDER NOTES
Encounter Date: 2018    SCRIBE #1 NOTE: I, Kim Lopes, am scribing for, and in the presence of,  Dr. Vega. I have scribed the following portions of the note - the EKG reading.       History     Chief Complaint   Patient presents with    Cerebrovascular Accident     Pt presents to ED via EMS from [facility] for neuro consult for CVA. CT of head was clear at [facility], and pt was given TPA. Pt presenting SXs prior to TPA: aphasia, RLE weakness. Pt slightly expressive aphasic. Pt able to state name and . Pt transported to CT.     HPI   73-year-old female with past medical history as reviewed in detail below presents as a stroke transfer from [facility] after receiving tPA.  Based on report she presented to [facility] with word-finding difficulty and mild right leg weakness that started around 7:15 p.m..  She was noted to be in AFib at [facility].  Tele stroke consult was obtained and tPA was recommended.  On arrival he at [facility] she denies headache chest pain nausea or extremity weakness.  She still states she has some word-finding difficulty though she is able to find the words and does take her a little time.    She denies recent fevers chills abdominal pain and headache dysuria chest pain shortness of breath.    Review of patient's allergies indicates:   Allergen Reactions    Ciprofloxacin Hives and Swelling     Swelling in throat    Bactrim [sulfamethoxazole-trimethoprim] Rash    Sulfur Hives    Toprol xl [metoprolol succinate] Other (See Comments)     Profuse sweating     Past Medical History:   Diagnosis Date    Acid reflux     Coronary artery disease due to calcified coronary lesion 2016    nonobstructive    Hypertension     Melanoma     Melanoma of back     Mitral valve prolapse      Past Surgical History:   Procedure Laterality Date    APPENDECTOMY      APPENDECTOMY      BREAST BIOPSY Right     benign excisional biopsy     "CARDIAC CATHETERIZATION      CHOLECYSTECTOMY      COLONOSCOPY      HYSTERECTOMY  1982    MANDIBLE SURGERY      OOPHORECTOMY Bilateral 1982    TONSILLECTOMY      TUBAL LIGATION       Family History   Problem Relation Age of Onset    Hypertension Mother     No Known Problems Father      Social History   Substance Use Topics    Smoking status: Never Smoker    Smokeless tobacco: Never Used    Alcohol use No     Review of Systems   Neurological: Positive for speech difficulty and weakness.   All other systems reviewed and are negative.      Physical Exam     Initial Vitals   BP Pulse Resp Temp SpO2   -- -- -- -- --      MAP       --         Vitals:    04/27/18 2300 04/27/18 2316   BP: (!) 150/88 (!) 176/99   Pulse: 92 (!) 118   Resp: 14 18   Temp: 98.3 °F (36.8 °C)    TempSrc: Oral    SpO2: 97% 97%   Weight: 72.6 kg (160 lb)    Height: 5' 6" (1.676 m)        Physical Exam  Gen/Constitutional: Interactive. No acute distress  Head: Normocephalic, Atraumatic  Neck: supple, no masses or LAD  Eyes: PERRLA, conjunctiva clear  Ears, Nose and Throat: No rhinorrhea or stridor.  Cardiac:  Irregularly irregular, No murmur  Pulmonary: CTA Bilat, no wheezes, rhonchi, rales.  GI: Abdomen soft, non-tender, non-distended; no rebound or guarding  : No CVA tenderness.  Musculoskeletal: Extremities warm, well perfused, no erythema, no edema  Skin: No rashes  Neuro: Alert and Oriented x 3; no face droop or dysarthria, she is able to identify a pen and a phone when I presented them to her and asked her what they were, no pronator drift in the upper lower extremities, normal finger-to-nose and heel-to-shin.  Psych: Normal affect    ED Course   Procedures  Labs Reviewed   CBC W/ AUTO DIFFERENTIAL - Abnormal; Notable for the following:        Result Value    Immature Granulocytes 0.9 (*)     Immature Grans (Abs) 0.10 (*)     All other components within normal limits   COMPREHENSIVE METABOLIC PANEL - Abnormal; Notable for the " following:     CO2 20 (*)     Total Protein 5.7 (*)     Albumin 2.9 (*)     Alkaline Phosphatase 53 (*)     All other components within normal limits   LIPID PANEL - Abnormal; Notable for the following:     LDL Cholesterol 58.6 (*)     All other components within normal limits   PROTIME-INR   TSH   APTT   HEMOGLOBIN A1C   POCT GLUCOSE   TYPE & SCREEN   TYPE & SCREEN   ISTAT PROCEDURE   ISTAT CREATININE   POCT GLUCOSE MONITORING CONTINUOUS   POCT GLUCOSE MONITORING CONTINUOUS     Imaging Results          X-Ray Chest AP Portable (Final result)  Result time 04/28/18 00:28:53    Final result by Yovany Samson MD (04/28/18 00:28:53)                 Impression:      No acute findings.    No significant change from prior study.      Electronically signed by: Yovany Samson MD  Date:    04/28/2018  Time:    00:28             Narrative:    EXAMINATION:  XR CHEST AP PORTABLE    CLINICAL HISTORY:  Stroke;    TECHNIQUE:  Single frontal view of the chest was performed.    COMPARISON:  April 27, 2018 at 8:40 p.m.    FINDINGS:  Heart and lungs  appear unchanged when allowing for differences in technique and positioning.                               CTA STROKE MULTI-PHASE (Final result)  Result time 04/27/18 23:59:08    Final result by Yovany Samson MD (04/27/18 23:59:08)                 Impression:      No acute abnormality. No high-grade stenosis or major vessel occlusion.    Generalized cerebral volume loss.    Additional findings as above.    Electronically signed by resident: Pasha Roy  Date:    04/27/2018  Time:    23:35    Electronically signed by: Yovany Samson MD  Date:    04/27/2018  Time:    23:59             Narrative:    EXAMINATION:  CTA STROKE MULTI-PHASE    CLINICAL HISTORY:  stroke;    TECHNIQUE:  Non contrast low dose axial images were obtained thought the head. CT angiogram was performed from the level of the meme to the top of the head following the IV administration of 100 of Omnipaque 350.    Sagittal and coronal reconstructions and maximum intensity projection reconstructions were performed. Arterial stenosis percentages are based on NASCET measurement criteria.  2 additional phases of immediate post-contrast CT images were performed through the head alone.    COMPARISON:  CT head without contrast 04/27/2018    FINDINGS:  Intracranial Compartment:    Ventricles and sulci are stable in size and configuration when compared to recent prior examination without evidence of hydrocephalus. No extra-axial blood or fluid collections.    The brain parenchyma demonstrates generalized cerebral volume loss.  No parenchymal mass, hemorrhage, edema, or major vascular distribution infarct.    Skull/Extracranial Contents (limited evaluation): No fracture. Mastoid air cells and paranasal sinuses are essentially clear.    Non-Vascular Structures of the Neck/Thoracic Inlet (limited evaluation): Osseous structures demonstrate degenerative change without evidence of significant spinal canal stenosis or high-grade neural foraminal narrowing.  Otherwise within normal limits.    Aorta: Normal 3 vessel arch.    Extracranial carotid circulation: No hemodynamically significant stenosis, aneurysmal dilatation, or dissection.    Extracranial vertebral circulation: No hemodynamically significant stenosis, aneurysmal dilatation, or dissection.    Intracranial Arteries: No focal high-grade stenosis, occlusion, or aneurysm.    Venous structures (limited evaluation): Within normal limits.                                EKG Readings: (Independently Interpreted)   EKG: atrial fibrillation with RVR, no NADIYA's or STD's, non-specific twave pattern, no STEMI       Clinical Tests:  Labs Test(s) were ordered and reviewed by me.  Radiological study(s) were ordered and reviewed by me.  Medical test(s) were ordered and reviewed by me.      Patient presents as a transfer from [facility] for emergent evaluation by vascular  Neurology for concern for stroke.  She received tPA at [facility].  Immediately on arrival I assessed the patient at the bedside and her only complaint is it takes her a little while to find the correct words but ultimately she is able to find the correct words and name objects presented to her.  She has no other notable neuro deficits at this time.  Vascular Neurology was called immediately on her arrival and CTA multiphase was ordered.  Vascular neurology evaluated the patient at the bedside.  Neuro ICU accepted the patient for admission.    Critical Care Procedure Note:  Direct patient critical care time: 10 minutes  Additional history critical care time:10 minutes  Ordering / reviewing labs and studies: critical care time:10 minutes  Documentation critical care time: 10 minutes  Consulting other physicians critical care time: 10 minutes  Total critical care time (exclusive of procedural time) : 50 minutes   Reason for Critical Care: Stroke       Medical Decision Making:   History:   Old Medical Records: I decided to obtain old medical records.  Other:   I have discussed this case with another health care provider.  I, Dr. Tylor Vega, personally performed the services described in this documentation. All medical record entries made by the scribe were at my direction and in my presence.  I have reviewed the chart and agree that the record reflects my personal performance and is accurate and complete. Tylor Vega MD.  1:01 AM 04/28/2018              Scribe Attestation:   Scribe #1: I performed the above scribed service and the documentation accurately describes the services I performed. I attest to the accuracy of the note.               Clinical Impression:   The primary encounter diagnosis was Stroke. A diagnosis of Atrial fibrillation with rapid ventricular response was also pertinent to this visit.    Disposition:   Disposition: Admitted                        Tylor Vega  MD  04/28/18 0102

## 2018-04-28 NOTE — PLAN OF CARE
Problem: SLP Goal  Goal: SLP Goal  Speech Language Pathology Goals  Goals expected to be met by 5/5  1. Pt will participate in further assessment of visual-spatial skills.   2. Pt will complete moderate level word finding tasks with 80% accuracy, to improve expressive language skills.   3.  Pt will list 15 items in one minute on a word fluency task, to improve word fluency/thought organization skills.   4.  Pt will complete categorization tasks with 90% accuracy, to improve expressive language skills.   5.  Further assess higher level cognitive skills including math/time/basic money/deduction to determine if further intervention is warranted.        ST evaluation completed.  Full session note to follow.  Recommend regular diet with thin liquids and universal aspiration precautions.     SAMANTHA Rizvi, CCC-SLP  Speech Language Pathologist  [phone]  4/28/2018

## 2018-04-28 NOTE — ED TRIAGE NOTES
Patient transferred from [facility] for CVA, TPA Administered bolus starting at 2101, infusion started at 2102. Patient was last seen normal at 1915 tonight. Patient presented to [facility] with expressive aphasia and weakness, CT was clear, Hx. Of A FIB. [facility] reports NIH of 6.

## 2018-04-28 NOTE — PROGRESS NOTES
Patient taken to MRI on a portable monitor per RN. Ambu bag in the bed. No acute events during the transfer. RN will continue to monitor.

## 2018-04-28 NOTE — CONSULTS
"  [facility]  Adult Nutrition  Consult Note    SUMMARY     Recommendations  Recommendation/Intervention:   1. As medically able, ADAT to Cardiac with texture per SLP.   2. If poor PO intake, recommend adding Boost Plus OS to all meals.   RD to monitor.    Goals: Patient to receive nutrition by RD follow-up  Nutrition Goal Status: new  Communication of RD Recs:  (POC)    Reason for Assessment  Reason for Assessment: consult  Diagnosis: stroke/CVA  Relevant Medical History: CAD, HTN  General Information Comments: Patient NPO. SLP recommending Regular diet.  Nutrition Discharge Planning: Adequate nutrition via PO intake.    Nutrition/Diet History  Do you have any cultural, spiritual, Baptism conflicts, given your current situation?: none known  Factors Affecting Nutritional Intake: NPO    Anthropometrics  Temp: 98.8 °F (37.1 °C)  Height Method: Stated  Height: 5' 6" (167.6 cm)  Height (inches): 66 in  Weight Method: Stated  Weight: 72.6 kg (160 lb)  Weight (lb): 160 lb  Ideal Body Weight (IBW), Female: 130 lb  % Ideal Body Weight, Female (lb): 123.08 lb  BMI (Calculated): 25.9  BMI Grade: 25 - 29.9 - overweight    Lab/Procedures/Meds  Pertinent Labs Reviewed: reviewed  Pertinent Labs Comments: Glu 131, Alb 3.2  Pertinent Medications Reviewed: reviewed  Pertinent Medications Comments: IVF    Physical Findings/Assessment  Overall Physical Appearance: nourished  Oral/Mouth Cavity: WDL  Skin: intact    Estimated/Assessed Needs  Weight Used For Calorie Calculations: 72.6 kg (160 lb 0.9 oz)  Energy Calorie Requirements (kcal): 1554 kcal/day  Energy Need Method: [place] (x .125)  Protein Requirements: 73-87 g/day (1.0-1.2 g/kg)  Weight Used For Protein Calculations: 72.6 kg (160 lb 0.9 oz)  Fluid Requirements (mL): 1 mL/kcal or per MD  Fluid Need Method: RDA Method  RDA Method (mL): 1554    Nutrition Prescription Ordered  Current Diet Order: NPO    Evaluation of Received Nutrient/Fluid " Intake  Comments: No BM recorded  % Intake of Estimated Energy Needs: 0 - 25 %  % Meal Intake: NPO    Nutrition Risk  Level of Risk/Frequency of Follow-up: high (2x/week)     Assessment and Plan  Embolic stroke involving left middle cerebral artery    Contributing Nutrition Diagnosis  Inadequate energy intake    Related to (etiology):   Decreased ability to consume sufficient energy    Signs and Symptoms (as evidenced by):   NPO with no alternative means of nutrition at this time     Nutrition Diagnosis Status:   New          Monitor and Evaluation  Food and Nutrient Intake: energy intake, food and beverage intake  Food and Nutrient Adminstration: diet order  Physical Activity and Function: nutrition-related ADLs and IADLs  Anthropometric Measurements: weight, weight change  Biochemical Data, Medical Tests and Procedures: electrolyte and renal panel, gastrointestinal profile, inflammatory profile  Nutrition-Focused Physical Findings: overall appearance     Nutrition Follow-Up  RD Follow-up?: Yes

## 2018-04-28 NOTE — CONSULTS
[facility]  Vascular Neurology  Comprehensive Stroke Center  Consult Note    Inpatient consult to Vascular (Stroke) Neurology  Consult performed by: COLLEEN GARCIA  Consult ordered by: JASPER SLADE  Reason for consult: transfer, post tPA, aphasia        Assessment/Plan:     S/P admn tPA in diff fac w/n last 24 hr bef adm to crnt fac    -Patient admitted to [facility] for close montioring.        Embolic stroke involving left middle cerebral artery    73 y.o. female with significant past medical history of GERD, HTN, CAD, Afib, and diverticulitis presented to hospital as a transfer from [facility] ED for evaluation of L MCA stroke symptoms.  Patient received tPA.  CTA MP negative for LVO.  Antithrombotics for secondary stroke prevention: Antiplatelets: None: Hold all Antithrombotics x 24 hours after IV t-PA administration    Statins for secondary stroke prevention and hyperlipidemia, if present:   Statins: Atorvastatin- 40 mg daily    Aggressive risk factor modification: HTN, A-Fib     Rehab efforts: PT/OT/SLP to evaluate and treat    Diagnostics ordered/pending: MRI head without contrast to assess brain parenchyma, TTE to assess cardiac function/status     VTE prophylaxis: None: Reason for No Pharmacological VTE Prophylaxis: Mechanical prophylaxis: Place SCDs, hold anticoagulation for 24hrs post tPA administration.      BP parameters: Infarct: Post tPA, SBP <180            AF (paroxysmal atrial fibrillation)    -Stroke risk factor.  Patient newly diagnosed w/Afib.  Reportedly not currently on anticoagulation, though review of home meds reveals Coumadin was prescribed in January 2018.  -Hold anticoagulation for 24 hrs post tPA administration.  Will need evaluation for anticoagulation prior to discharge.        Hyperlipidemia    -Stroke risk factor.  LDL 58.6.  -Atorvastatin 20 mg daily        Benign essential HTN    -Stroke risk factor.  SBP<180.          Carotid arterial  disease    -Stroke risk factor.  No hemodynamically significant stenosis on CTA MP.            STROKE DOCUMENTATION     Acute Stroke Times   Last Known Normal Date: 04/27/18  Last Known Normal Time: 1930  Symptom Onset Date: 04/27/18  Symptom Onset Time: 1930  Stroke Team Called Date: 04/27/18  Stroke Team Called Time: 2020  Stroke Team Arrival Date: 04/27/18  Stroke Team Arrival Time: 2022  CT Interpretation Time: 2030  Decision to Treat Time for Alteplase: 2045  Decision to Treat Time for IR: 0000 (not an IR candidate)    NIH Scale:  Interval: baseline (upon arrival/admit)  1a. Level Of Consciousness: 0-->Alert: keenly responsive  1b. LOC Questions: 1-->Answers one question correctly  1c. LOC Commands: 0-->Performs both tasks correctly  2. Best Gaze: 0-->Normal  3. Visual: 0-->No visual loss  4. Facial Palsy: 0-->Normal symmetrical movements  5a. Motor Arm, Left: 0-->No drift: limb holds 90 (or 45) degrees for full 10 secs  5b. Motor Arm, Right: 0-->No drift: limb holds 90 (or 45) degrees for full 10 secs  6a. Motor Leg, Left: 0-->No drift: leg holds 30 degree position for full 5 secs  6b. Motor Leg, Right: 0-->No drift: leg holds 30 degree position for full 5 secs  7. Limb Ataxia: 0-->Absent  8. Sensory: 0-->Normal: no sensory loss  9. Best Language: 2-->Severe aphasia: all communication is through fragmentary expression: great need for inference, questioning, and guessing by the listener. Range of information that can be exchanged is limited: listener carries burden of. . . (see row details)  10. Dysarthria: 0-->Normal  11. Extinction and Inattention (formerly Neglect): 0-->No abnormality  Total (NIH Stroke Scale): 3    Modified Laura Score: 1  King Coma Scale:14   ABCD2 Score:    [id number] Score:5  HAS -BLED Score:3  ICH Score:   Hunt & Garber Classification:       Thrombolysis Candidate? Yes, given prior to arrival at outside hospital      Interventional Revascularization Candidate?   Is the patient  eligible for mechanical endovascular reperfusion (JESU)?  No; No large vessel occlusion      Hemorrhagic change of an Ischemic Stroke: Does this patient have an ischemic stroke with hemorrhagic changes? No     Subjective:     History of Present Illness:  Patient is a 73 y.o. female with significant past medical history of GERD, HTN, CAD, Afib, and diverticulitis presented to hospital as a transfer from [facility] ED for evaluation of L MCA stroke symptoms.  Patient was LKN 1930.  She acutely developed aphasia, with right sided weakness.  CTH at OSH negative for acute findings.  Telestroke consult performed by Dr. Tinoco.  tPA administered.  Transferred to [facility] for further evaluation/possible intervention.  On arrival patient aphasic.  She has no other complaints besides the urge to urinate.  The patient was taken to CT for CTA MP.  Asia Noble and Hussain reviewed images as acquired.  No LVO.  Patient not a candidate for IR intervention.  Will be admitted to [facility] for close monitoring post tPA administration.          Past Medical History:   Diagnosis Date    Acid reflux     Coronary artery disease due to calcified coronary lesion 7/13/2016    nonobstructive    Hypertension     Melanoma 1994    Melanoma of back     Mitral valve prolapse      Past Surgical History:   Procedure Laterality Date    APPENDECTOMY      APPENDECTOMY      BREAST BIOPSY Right     benign excisional biopsy    CARDIAC CATHETERIZATION      CHOLECYSTECTOMY      COLONOSCOPY      HYSTERECTOMY  1982    MANDIBLE SURGERY      OOPHORECTOMY Bilateral 1982    TONSILLECTOMY      TUBAL LIGATION       Family History   Problem Relation Age of Onset    Hypertension Mother     No Known Problems Father      Social History   Substance Use Topics    Smoking status: Never Smoker    Smokeless tobacco: Never Used    Alcohol use No     Review of patient's allergies indicates:   Allergen Reactions    Ciprofloxacin Hives  and Swelling     Swelling in throat    Bactrim [sulfamethoxazole-trimethoprim] Rash    Sulfur Hives    Toprol xl [metoprolol succinate] Other (See Comments)     Profuse sweating       Medications: I have reviewed the current medication administration record.      Current Outpatient Prescriptions:     aspirin (ECOTRIN) 81 MG EC tablet, Take 81 mg by mouth once daily., Disp: , Rfl:     atenolol (TENORMIN) 25 MG tablet, Take 1 tablet (25 mg total) by mouth once daily., Disp: 30 tablet, Rfl: 5    benazepril (LOTENSIN) 10 MG tablet, Take 1 tablet (10 mg total) by mouth once daily. (Patient taking differently: Take 20 mg by mouth once daily. ), Disp: 90 tablet, Rfl: 1    CALCIUM CARBONATE (CALCIUM 500 ORAL), Take 1,200 mg by mouth once daily., Disp: , Rfl:     cholecalciferol, vitamin D3, (VITAMIN D3) 2,000 unit Cap, Take 1 capsule by mouth once daily., Disp: , Rfl:     cloNIDine (CATAPRES) 0.1 MG tablet, Take 1 tablet (0.1 mg total) by mouth 3 (three) times daily., Disp: 90 tablet, Rfl: 11    escitalopram oxalate (LEXAPRO) 20 MG tablet, TAKE ONE TABLET BY MOUTH ONCE DAILY, Disp: 90 tablet, Rfl: 3    fluticasone (FLONASE) 50 mcg/actuation nasal spray, 1 spray by Nasal route as needed. , Disp: , Rfl: 4    FLUZONE HIGH-DOSE 2017-18, PF, 180 mcg/0.5 mL vaccine, ADM 0.5ML IM UTD, Disp: , Rfl: 0    furosemide (LASIX) 20 MG tablet, TAKE 1 TABLET(20 MG) BY MOUTH EVERY DAY (Patient taking differently: TAKE 1 TABLET(20 MG) BY MOUTH EVERY DAY prn), Disp: 30 tablet, Rfl: 0    hydroCHLOROthiazide (HYDRODIURIL) 25 MG tablet, Take 1 tablet (25 mg total) by mouth once daily., Disp: 30 tablet, Rfl: 5    hydrocodone-acetaminophen 5-325mg (NORCO) 5-325 mg per tablet, Take 1 tablet by mouth every 4 (four) hours as needed for Pain., Disp: 18 tablet, Rfl: 0    ondansetron (ZOFRAN-ODT) 4 MG TbDL, Take 1 tablet (4 mg total) by mouth every 8 (eight) hours as needed., Disp: 20 tablet, Rfl: 0    triamcinolone acetonide 0.1%  (KENALOG) 0.1 % cream, Apply topically 2 (two) times daily., Disp: 15 g, Rfl: 0    warfarin (COUMADIN) 5 MG tablet, Take 1 tablet (5 mg total) by mouth Daily., Disp: 30 tablet, Rfl: 6      Review of Systems   Constitutional: Negative for chills and fever.   HENT: Negative for drooling and trouble swallowing.    Eyes: Negative for redness and visual disturbance.   Respiratory: Negative for cough and shortness of breath.    Cardiovascular: Negative for chest pain and palpitations.   Gastrointestinal: Negative for abdominal pain, nausea and vomiting.   Endocrine: Negative for cold intolerance and heat intolerance.   Genitourinary: Positive for frequency. Negative for dysuria.   Musculoskeletal: Negative for neck pain and neck stiffness.   Skin: Negative for rash and wound.   Allergic/Immunologic: Negative for environmental allergies and food allergies.   Neurological: Positive for facial asymmetry, speech difficulty, weakness and headaches. Negative for dizziness and numbness.   Hematological: Negative for adenopathy. Does not bruise/bleed easily.   Psychiatric/Behavioral: Negative for agitation.     Objective:     Vital Signs (Most Recent):  Temp: 98.3 °F (36.8 °C) (04/27/18 2300)  Pulse: (!) 119 (04/28/18 0031)  Resp: 18 (04/28/18 0031)  BP: (!) 145/110 (04/28/18 0031)  SpO2: 95 % (04/28/18 0031)    Vital Signs Range (Last 24H):  Temp:  [98.3 °F (36.8 °C)-98.9 °F (37.2 °C)]   Pulse:  []   Resp:  [14-18]   BP: (140-189)/()   SpO2:  [95 %-97 %]     Physical Exam   Constitutional: She is oriented to person, place, and time. She appears well-developed and well-nourished. No distress.   HENT:   Head: Normocephalic and atraumatic.   Right Ear: External ear normal.   Left Ear: External ear normal.   Nose: Nose normal.   Mouth/Throat: Oropharynx is clear and moist. No oropharyngeal exudate.   Eyes: Conjunctivae and EOM are normal. Pupils are equal, round, and reactive to light. Right eye exhibits no discharge.  Left eye exhibits no discharge. No scleral icterus.   Neck: Normal range of motion. Neck supple. No thyromegaly present.   Cardiovascular: Normal rate, regular rhythm and normal heart sounds.    No murmur heard.  Pulmonary/Chest: Effort normal and breath sounds normal. No respiratory distress. She has no wheezes. She has no rhonchi. She has no rales.   Abdominal: Soft. Bowel sounds are normal. She exhibits no distension. There is no hepatosplenomegaly. There is no tenderness.   Musculoskeletal: She exhibits no edema.   Lymphadenopathy:     She has no cervical adenopathy.   Neurological: She is alert and oriented to person, place, and time.   Skin: Skin is warm and dry. Capillary refill takes less than 2 seconds. She is not diaphoretic.   Psychiatric: She has a normal mood and affect.   Nursing note and vitals reviewed.      Neurological Exam:   LOC: alert  Attention Span: Good   Language: Expressive aphasia, Naming impaired  Articulation: No dysarthria  Visual Fields: Full  EOM (CN III, IV, VI): Full/intact  Pupils (CN II, III): PERRL  Facial Sensation (CN V): Normal  Motor: Arm left  Normal 5/5  Leg left  Normal 5/5  Arm right  Normal 5/5  Leg right Normal 5/5  Cebellar: No evidence of appendicular or axial ataxia  Sensation: Intact to light touch, temperature and vibration      Laboratory:  CMP:   Recent Labs  Lab 04/27/18 2328   CALCIUM 9.1   ALBUMIN 2.9*   PROT 5.7*      K 3.9   CO2 20*      BUN 15   CREATININE 0.8   ALKPHOS 53*   ALT 11   AST 20   BILITOT 0.3     CBC:   Recent Labs  Lab 04/27/18 2328   WBC 10.93   RBC 4.26   HGB 12.2   HCT 37.5      MCV 88   MCH 28.6   MCHC 32.5     Lipid Panel:   Recent Labs  Lab 04/27/18 2328   CHOL 124   LDLCALC 58.6*   HDL 43   TRIG 112     Coagulation:   Recent Labs  Lab 04/27/18 2328   INR 1.2   APTT <21.0     Hgb A1C:   Recent Labs  Lab 04/28/18  0028   HGBA1C 5.3     TSH:   Recent Labs  Lab 04/27/18 2328   TSH 3.750       Diagnostic  Results:      Brain imaging:  CTH 4/27/18 No acute findings    MRI Brain pending    Vessel Imaging:  CTA MP 4/27/18 negative for vessel occlusion or stenosis.    Cardiac Evaluation:   2D Echo pending      Alyson Anderson DNP, NP  Comprehensive Stroke Center  Department of Vascular Neurology   [facility]

## 2018-04-28 NOTE — PROGRESS NOTES
RN notified NCC team that the patient is jumping in/out of A. Fib RVR. RN ordered to ask if the patient has an allergy to lopressor. RN will follow through with order and call ANJEL Oscar back. RN will continue to monitor.

## 2018-04-28 NOTE — PT/OT/SLP EVAL
"Physical Therapy Evaluation   Discharge Note    Patient Name:  Lyn Rapp   MRN:  [id number]    Recommendations:     Discharge Recommendations:  home   Discharge Equipment Recommendations: none   Barriers to discharge: None    Assessment:     Lyn Rapp is a 73 y.o. female admitted with a medical diagnosis of L MCA stroke s/p TPA.  She was completely independent prior to admit.  At this time, patient is functioning at their prior level of function and does not require further acute PT services. She is safe to perform bed mobility, transfers, and gait once TPA precautions are lifted.  Recommend d/c home when medically appropriate with no additional PT follow up.      Recent Surgery: * No surgery found *      Plan:     During this hospitalization, patient does not require further acute PT services.  Please re-consult if situation changes.     Plan of Care Reviewed with: patient, spouse    History:     Past Medical History:   Diagnosis Date    Acid reflux     Coronary artery disease due to calcified coronary lesion 7/13/2016    nonobstructive    Hypertension     Melanoma 1994    Melanoma of back     Mitral valve prolapse        Past Surgical History:   Procedure Laterality Date    APPENDECTOMY      APPENDECTOMY      BREAST BIOPSY Right     benign excisional biopsy    CARDIAC CATHETERIZATION      CHOLECYSTECTOMY      COLONOSCOPY      HYSTERECTOMY  1982    MANDIBLE SURGERY      OOPHORECTOMY Bilateral 1982    TONSILLECTOMY      TUBAL LIGATION         Subjective     Patient comments/goals: "I'm not stupid.  I know what I want to say, but the words do not come out."  Pain/Comfort:  · Pain Rating 1: 0/10  · Pain Rating Post-Intervention 1: 0/10    Living Environment:  Pt lives with her  in [place], [place] in a 1 story home with no steps to enter. She is completely independent, driving, and working as an  at [employer]. Pt neither owns or uses DME.  Upon " discharge, patient will have assistance from her .    Objective:     Patient found with: blood pressure cuff, telemetry, pulse ox (continuous), peripheral IV     General Precautions: Standard, aspiration, aphasia, fall   Patient found supine in bed with family present and requesting to use the BSC.  PT assisted patient to the BSC and monitored patient closely under TPA precautions.     PHYSICAL EXAMINATION  Cognitive Function:  - Oriented to: person, place, time and situation   - Level of Alertness: awake, alert, appropriate  - Follows Commands/attention: Follows multistep  commands  - Communication: occasional word finding difficulty  - Safety awareness/insight to disability: intact  Musculoskeletal System  Upper Extremities:   ROM: hands limited by OA with minor joint deformity   Strength: WFL  Lower Extremities:  ROM: WFL, knee crepitus present   Strength: WFL in all major muscle groups  Integumentary System: Visible skin intact  Cardiopulmonary System:   - HR/BP: 100 bpm; 178/99  Neuromuscular System:  - Sensation: grossly intact   - Coordination:    Finger to thumb opposition: limited d/t joint deformity only but symmetrical   Finger to nose: grossly intact   Vision:  NT    BALANCE:  Sitting: independent with good midline, no postural sway and good balance  Standing: SBA with good midline, no postural sway and no balance deviations.     FUNCTIONAL MOBILITY ASSESSMENT:  Bed Mobility: performed with HOB flat  - Rolling/Turning R: independent  - Rolling/Turning L: independent  - Supine <> sit: stand by assist for line management only   - Scooting EOB: SBA     Transfers:  - Sit <> stand transfer: SBA    - Bed <> chair transfer: SBA via stand pivot transfer  - Toilet transfer: SBA with patient independently managing clothing and performing dennis care with set up assistance    Gait:   Gait x 10 feet with SBA, no LOB and no safety concerns  - Patient demonstrated decreased step length and decreased step length  but consistent with environmental obstacles  - Gait limited by TPA precautions.       THERAPEUTIC ACTIVITIES AND EXERCISES:  Therapist educated patient and family on the role of PT, POC, and therapy recommendations of no PT needs.  Therapist discussed the patients current mobility status and level of assistance with patient and family.  PT reviewed TPA precautions with family.  Therapist answered questions to patient/familys satisfaction within scope of practice.  White board updated to reflect current level of assistance.      Patient left supine with all lines intact, call button in reach and family  present.     AM-PAC 6 CLICK MOBILITY  Total Score:24     Clinical Decision Making:   COMPLEXITY OF PT EXAMINATION:  HISTORY  - Comorbidities that affect the PT plan of care or the patient's ability to participate in/progress with therapy:  1. LESLIE  2. Osteopenia  3. A-fib  4. CAD  - Personal Factors:   1. Time since onset of injury / illness / exacerbation.  EXAMINATION  - Body Systems:  1. Communication ability, affect, cognition, language, and learning style: the assessment of the ability to make needs known, consciousness, orientation, expected emotional /behavioral responses, and learning preferences  2. Neuromuscular system: a general assessment of gross coordinated movement (eg, balance, gait, locomotion, transfers, and transitions) and motor function (motor control and motor learning)  3. Musculoskeletal system: the assessment of gross symmetry, gross range of motion, gross strength, height, and weight  4. Cardiovascular/pulmonary system: the assessment of heart rate, respiratory rate, blood pressure, SpO2, and edema   - Activity or participation limitations:   TPA precautions  CLINICAL PRESENTATION: Evolving/changing characteristics  Pt evaluated within 24 hrs of receiving TPA with vitals monitored continuously.    LEVEL OF COMPLEXITY: Moderate Complexity - at least 1-2 personal factors or comorbidities that  impact the plan of care; examination addressing at least 3 body structures and functions, activity limitations, and/or participation restrictions; and clinical presentation with evolving or changing characteristics.    Time Tracking:     PT Received On: 04/28/18  PT Start Time: 0857     PT Stop Time: 0912  PT Total Time (min): 15 min     Billable Minutes: Evaluation 15      Regina Dash, PT  04/28/2018

## 2018-04-28 NOTE — PLAN OF CARE
Problem: Patient Care Overview  Goal: Plan of Care Review  Outcome: Ongoing (interventions implemented as appropriate)  POC reviewed with patient and family at 1430. Patient verbalized understanding. Questions and concerns addressed with patient and family. Patient is on 24 hour TPA watch. MRI is scheduled for 1500. 1 episode of emesis. RN to give zofran when approved per pharmacy. Patient progressing toward goals. RN will continue to monitor. See flowsheets for full assessment and VS info.

## 2018-04-28 NOTE — ED NOTES
Patient used bedside commode. Small amount of blood in either urine or stool. Patient reports that she has been having blood in stool over the past couple of weeks due to Diverticulitis.

## 2018-04-28 NOTE — PROGRESS NOTES
[facility]  Vascular Neurology  Comprehensive Stroke Center  Progress Note    Assessment/Plan:     * Embolic stroke involving left middle cerebral artery    73 y.o. female with significant past medical history of GERD, HTN, CAD, Afib, and diverticulitis presented to hospital as a transfer from [facility] ED for evaluation of L MCA stroke symptoms.  Patient received tPA.  CTA MP negative for LVO. Source is likely thromboembolic as patient has A Fib and stopped taking coumadin about 3 weeks ago.    MRI ordered for today. Patient has mild expressive aphasia. Right UE and LE strength has improved    Antithrombotics for secondary stroke prevention: Antiplatelets: None: Hold all Antithrombotics x 24 hours after IV t-PA administration    Statins for secondary stroke prevention and hyperlipidemia, if present:   Statins: Atorvastatin- 40 mg daily    Aggressive risk factor modification: HTN, A-Fib     Rehab efforts: PT/OT/SLP to evaluate and treat    Diagnostics ordered/pending: MRI head without contrast to assess brain parenchyma, TTE to assess cardiac function/status     VTE prophylaxis: None: Reason for No Pharmacological VTE Prophylaxis: Mechanical prophylaxis: Place SCDs, hold anticoagulation for 24hrs post tPA administration.      BP parameters: Infarct: Post tPA, SBP <180            S/P admn tPA in diff fac w/n last 24 hr bef adm to crnt fac    -Patient admitted to [facility] for close montioring.        AF (paroxysmal atrial fibrillation)    - Stroke risk factor.  Patient newly diagnosed w/Afib.  Reportedly stopped taking coumadin about 3 weeks ago due to concerns about bleeding  - source of stroke was likely thromboembolic   - Recommend rate control with beta blockers (currently on atenolol. Consider metoprolol)  - Hold anticoagulation for 24 hrs post tPA administration.  Will need to resume coumadin at discharge         Hyperlipidemia    -Stroke risk factor.  LDL 58.6.  -Atorvastatin 20  mg daily        Benign essential HTN    -Stroke risk factor.  SBP<180.          Carotid arterial disease    -Stroke risk factor.  No hemodynamically significant stenosis on CTA MP.             4/27: Admitted to [facility] s/p tPA at OSH for L MCA stroke symptoms, likely thromboembolic in origin. CTH and CTA head/neck showed no acute abnormality, no high-grade stenosis or major vessel occlusion  4/28: Aphasia improved overnight. Patient with mild residual expressive. Improvement in right sided strength. HR in 120s. Holding anti-coagulation due to recent tPA    STROKE DOCUMENTATION   Acute Stroke Times   Last Known Normal Date: 04/27/18  Last Known Normal Time: 1930  Symptom Onset Date: 04/27/18  Symptom Onset Time: 1930  Stroke Team Called Date: 04/27/18  Stroke Team Called Time: 2020  Stroke Team Arrival Date: 04/27/18  Stroke Team Arrival Time: 2022  CT Interpretation Time: 2030  Decision to Treat Time for Alteplase: 2045  Decision to Treat Time for IR: 0000 (not an IR candidate)    NIH Scale:  1a. Level Of Consciousness: 0-->Alert: keenly responsive  1b. LOC Questions: 0-->Answers both questions correctly  1c. LOC Commands: 0-->Performs both tasks correctly  2. Best Gaze: 0-->Normal  3. Visual: 0-->No visual loss  4. Facial Palsy: 0-->Normal symmetrical movements  5a. Motor Arm, Left: 0-->No drift: limb holds 90 (or 45) degrees for full 10 secs  5b. Motor Arm, Right: 0-->No drift: limb holds 90 (or 45) degrees for full 10 secs  6a. Motor Leg, Left: 1-->Drift: leg falls by the end of the 5-sec period but does not hit bed  6b. Motor Leg, Right: 0-->No drift: leg holds 30 degree position for full 5 secs  7. Limb Ataxia: 0-->Absent  8. Sensory: 0-->Normal: no sensory loss  9. Best Language: 0-->No aphasia: normal  10. Dysarthria: 1-->Mild-to-moderate dysarthria: patient slurs at least some words and, at worst, can be understood with some difficulty  11. Extinction and Inattention (formerly Neglect): 0-->No abnormality  Total  (NIH Stroke Scale): 2       Modified [place] Score: 1  [place] Coma Scale:15   ABCD2 Score:    [id number]-PMY Score:5  HAS -BLED Score:3  ICH Score:   Hunt & Garber Classification:      Hemorrhagic change of an Ischemic Stroke: Does this patient have an ischemic stroke with hemorrhagic changes? No     Neurologic Chief Complaint: aphasia, right hemiparesis    Subjective:     Interval History: Patient is seen for follow-up neurological assessment and treatment recommendations:     Aphasia improved overnight. Patient with mild residual expressive. Improvement in right sided strength. HR in 120s. Holding anti-coagulation due to recent tPA    HPI, Past Medical, Family, and Social History remains the same as documented in the initial encounter.     Review of Systems   Constitutional: Negative for chills and fever.   HENT: Negative for drooling and trouble swallowing.    Eyes: Negative for redness and visual disturbance.   Respiratory: Negative for cough and shortness of breath.    Cardiovascular: Negative for chest pain and palpitations.   Gastrointestinal: Negative for abdominal pain, nausea and vomiting.   Endocrine: Negative for cold intolerance and heat intolerance.   Genitourinary: Negative for dysuria and frequency.   Musculoskeletal: Negative for neck pain and neck stiffness.   Skin: Negative for rash and wound.   Allergic/Immunologic: Negative for environmental allergies and food allergies.   Neurological: Positive for facial asymmetry, speech difficulty, weakness and headaches. Negative for dizziness and numbness.   Hematological: Negative for adenopathy. Does not bruise/bleed easily.   Psychiatric/Behavioral: Negative for agitation.     Scheduled Meds:   atenolol  25 mg Oral Daily    atorvastatin  40 mg Oral Daily    sodium chloride 0.9%  3 mL Intravenous Q8H     Continuous Infusions:   sodium chloride 0.9% 75 mL/hr at 04/28/18 1400     PRN Meds:acetaminophen, dextrose 50%, glucagon (human recombinant),  hydrALAZINE, insulin aspart U-100, labetalol, magnesium oxide, magnesium oxide, polyethylene glycol, potassium chloride 10%, potassium chloride 10%, potassium chloride 10%, potassium, sodium phosphates, potassium, sodium phosphates, potassium, sodium phosphates, promethazine (PHENERGAN) IVPB    Objective:     Vital Signs (Most Recent):  Temp: 98.3 °F (36.8 °C) (04/28/18 1102)  Pulse: 107 (04/28/18 1402)  Resp: 19 (04/28/18 1402)  BP: (!) 142/68 (04/28/18 1402)  SpO2: 95 % (04/28/18 1402)  BP Location: Left arm    Vital Signs Range (Last 24H):  Temp:  [98.2 °F (36.8 °C)-98.9 °F (37.2 °C)]   Pulse:  []   Resp:  [14-28]   BP: (114-189)/()   SpO2:  [94 %-97 %]   BP Location: Left arm    Physical Exam   Constitutional: She is oriented to person, place, and time. She appears well-developed and well-nourished. No distress.   HENT:   Head: Normocephalic and atraumatic.   Right Ear: External ear normal.   Left Ear: External ear normal.   Nose: Nose normal.   Mouth/Throat: Oropharynx is clear and moist. No oropharyngeal exudate.   Eyes: Conjunctivae and EOM are normal. Pupils are equal, round, and reactive to light. Right eye exhibits no discharge. Left eye exhibits no discharge. No scleral icterus.   Neck: Normal range of motion. Neck supple. No thyromegaly present.   Cardiovascular: Normal rate, regular rhythm and normal heart sounds.    No murmur heard.  Pulmonary/Chest: Effort normal and breath sounds normal. No respiratory distress. She has no wheezes. She has no rhonchi. She has no rales.   Abdominal: Soft. Bowel sounds are normal. She exhibits no distension. There is no hepatosplenomegaly. There is no tenderness.   Musculoskeletal: She exhibits no edema.   Lymphadenopathy:     She has no cervical adenopathy.   Neurological: She is alert and oriented to person, place, and time.   Skin: Skin is warm and dry. Capillary refill takes less than 2 seconds. She is not diaphoretic.   Psychiatric: She has a normal  mood and affect.   Nursing note and vitals reviewed.      Neurological Exam:   LOC: alert  Attention Span: Good   Language: Expressive aphasia  Articulation: No dysarthria  Orientation: Person, Place, Time   Visual Fields: Full  EOM (CN III, IV, VI): Full/intact  Pupils (CN II, III): PERRL  Facial Sensation (CN V): Normal  Facial Movement (CN VII): Symmetric facial expression    Motor: Arm left  Normal 5/5  Leg left  Normal 5/5  Arm right  Normal 5/5  Leg right Paresis: 4/5  Cebellar: No evidence of appendicular or axial ataxia  Sensation: Intact to light touch, temperature and vibration  Tone: Normal tone throughout    Laboratory:  CMP:   Recent Labs  Lab 04/28/18  0358   CALCIUM 9.4   ALBUMIN 3.2*   PROT 6.2      K 3.9   CO2 19*      BUN 15   CREATININE 0.9   ALKPHOS 57   ALT 13   AST 22   BILITOT 0.4     CBC:   Recent Labs  Lab 04/28/18  0358   WBC 8.86   RBC 4.38   HGB 12.3   HCT 37.9      MCV 87   MCH 28.1   MCHC 32.5     Coagulation:   Recent Labs  Lab 04/27/18  2328   INR 1.2   APTT <21.0     TSH:   Recent Labs  Lab 04/27/18  2328   TSH 3.750       Diagnostic Results     Brain Imaging   CTH 4/27  No acute intracranial abnormality. Chronic changes     CTA head/neck 4/27  No acute abnormality. No high-grade stenosis or major vessel occlusion.    MRI brain pending     Vessel Imaging   CTA head/neck 4/27  No acute abnormality. No high-grade stenosis or major vessel occlusion.    Cardiac Imaging   2D echo 4/28    1 - Mildly depressed left ventricular systolic function (EF 45-50%).     2 - Concentric remodeling.     3 - Wall motion abnormalities.     4 - Low normal right ventricular systolic function .     5 - Mild to moderate mitral regurgitation.     6 - Trivial tricuspid regurgitation.     7 - The estimated PA systolic pressure is 28 mmHg.         Gaurang Morris MD  Comprehensive Stroke Center  Department of Vascular Neurology   [facility]

## 2018-04-28 NOTE — ASSESSMENT & PLAN NOTE
73 y.o. female with significant past medical history of GERD, HTN, CAD, Afib, and diverticulitis presented to hospital as a transfer from [facility] ED for evaluation of L MCA stroke symptoms.  Patient received tPA.  CTA MP negative for LVO.  Antithrombotics for secondary stroke prevention: Antiplatelets: None: Hold all Antithrombotics x 24 hours after IV t-PA administration    Statins for secondary stroke prevention and hyperlipidemia, if present:   Statins: Atorvastatin- 40 mg daily    Aggressive risk factor modification: HTN, A-Fib     Rehab efforts: PT/OT/SLP to evaluate and treat    Diagnostics ordered/pending: MRI head without contrast to assess brain parenchyma, TTE to assess cardiac function/status     VTE prophylaxis: None: Reason for No Pharmacological VTE Prophylaxis: Mechanical prophylaxis: Place SCDs, hold anticoagulation for 24hrs post tPA administration.      BP parameters: Infarct: Post tPA, SBP <180

## 2018-04-28 NOTE — PLAN OF CARE
Problem: Physical Therapy Goal  Goal: Physical Therapy Goal  Outcome: Outcome(s) achieved Date Met: 04/28/18  Initial eval completed.  Results, POC, and therapy recommendations discussed with patient and family.  Complete evaluation documentation to follow.     Pt is independent with bed mobility, transfers, and gait without an AD.  She has no PT needs at this time.  Pt is safe to d/c home with no DME when medically appropriate.  Please refer to OT and SLP for any additional d/c recommendations.     Regina Dash, PT  4/28/2018  [phone] (pager)

## 2018-04-28 NOTE — ED NOTES
Patient identifiers have been checked and are correct.  Patient assisted to ED stretcher from CT Table.    LOC: The patient is awake, alert, and aware of environment.   APPEARANCE: No acute distress noted.   PSYCHOSOCIAL: Patient is calm and cooperative. Patients insight and judgement are appropriate to situation. Appears clean, well maintained, with clothing appropriate to environment. No evidence of delusions, hallucinations, or psychosis.  SKIN: The skin is warm, dry, and intact. No bruising/discolorations noted.  RESPIRATORY: Airway is open and patent. Bilateral chest rise and fall. Respirations are spontaneous, even and unlabored. Normal effort and rate noted. No accessory muscle use noted.   CARDIAC: Patient is in A Fib, with elevated HR. No peripheral edema noted. Capillary refill <3 seconds.   ABDOMEN: Soft but tender to palpation in mid the left quads, HX. Of Diverticulitis w bleeding. No distention noted. Bowel sounds present in all 4 quadrants.   URINARY: Patient reports routine urination without pain, frequency, or urgency. Voids independently. Reports urine appears pastora/yellow in color.  EXTREMITIES: No redness, heat, swelling, deformity, or pain.  PULSES: 2+ and equal in all extremities.   NEUROLOGIC: SEE NEURO ASSESSMENT  MUSCULOSKELETAL: No obvious deformities noted.

## 2018-04-28 NOTE — PROGRESS NOTES
ICU Progress Note  Neurocritical Care    Admit Date: 4/27/2018  LOS: 1    CC: Embolic stroke involving left middle cerebral artery    Code Status: Full Code     SUBJECTIVE:     Interval History/Significant Events:  Acute embolic stroke Left MCA; poa  S/p TPA at outside facility  Hypokalemia;poa  paroxysmal  atrial fibrillation ;poa  Body mass index is 24.48 kg/m².  Essential hypertension  Cytotoxic cerebral edema;poa      Medications:  Continuous Infusions:   sodium chloride 0.9% 75 mL/hr at 04/28/18 1400     Scheduled Meds:   atenolol  25 mg Oral Daily    atorvastatin  40 mg Oral Daily    sodium chloride 0.9%  3 mL Intravenous Q8H     PRN Meds:.acetaminophen, dextrose 50%, glucagon (human recombinant), hydrALAZINE, insulin aspart U-100, labetalol, magnesium oxide, magnesium oxide, ondansetron, polyethylene glycol, potassium chloride 10%, potassium chloride 10%, potassium chloride 10%, potassium, sodium phosphates, potassium, sodium phosphates, potassium, sodium phosphates, promethazine (PHENERGAN) IVPB    OBJECTIVE:   Vital Signs (Most Recent):   Temp: 98.3 °F (36.8 °C) (04/28/18 1102)  Pulse: 107 (04/28/18 1402)  Resp: 19 (04/28/18 1402)  BP: (!) 142/68 (04/28/18 1402)  SpO2: 95 % (04/28/18 1402)    Vital Signs (24h Range):   Temp:  [98.2 °F (36.8 °C)-98.9 °F (37.2 °C)] 98.3 °F (36.8 °C)  Pulse:  [] 107  Resp:  [14-28] 19  SpO2:  [94 %-97 %] 95 %  BP: (114-189)/() 142/68    ICP/CPP (Last 24h):        I & O (Last 24h):   Intake/Output Summary (Last 24 hours) at 04/28/18 1445  Last data filed at 04/28/18 1400   Gross per 24 hour   Intake            752.5 ml   Output                0 ml   Net            752.5 ml     Physical Exam:  GA: Alert, comfortable, no acute distress.   HEENT: No scleral icterus or JVD.   Pulmonary: Clear to auscultation A/P/L. No wheezing, crackles, or rhonchi.  Cardiac: RRR S1 & S2 w/o rubs/murmurs/gallops.   Abdominal: Bowel sounds present x 4. No appreciable  hepatosplenomegaly.  Skin: No jaundice, rashes, or visible lesions.  Neuro:  Awake  Alert   ox3  Power 5/5 all exts       Lines/Drains/Airway:          Nutrition/Tube Feeds (if NPO state why): npo    Labs:  ABG: No results for input(s): PH, PO2, PCO2, HCO3, POCSATURATED, BE in the last 24 hours.  BMP:  Recent Labs  Lab 04/28/18  0358      K 3.9      CO2 19*   BUN 15   CREATININE 0.9   *   MG 2.0   PHOS 3.3     LFT: Lab Results   Component Value Date    AST 22 04/28/2018    ALT 13 04/28/2018    ALKPHOS 57 04/28/2018    BILITOT 0.4 04/28/2018    ALBUMIN 3.2 (L) 04/28/2018    PROT 6.2 04/28/2018     CBC:   Lab Results   Component Value Date    WBC 8.86 04/28/2018    HGB 12.3 04/28/2018    HCT 37.9 04/28/2018    MCV 87 04/28/2018     04/28/2018     Microbiology x 7d:   Microbiology Results (last 7 days)     ** No results found for the last 168 hours. **        Imaging:  MRI pending    I personally reviewed the above image.    ASSESSMENT/PLAN:     Active Hospital Problems    Diagnosis    *Embolic stroke involving left middle cerebral artery    S/P admn tPA in diff fac w/n last 24 hr bef adm to crnt fac    Stroke    AF (paroxysmal atrial fibrillation)    Carotid arterial disease    Hyperlipidemia    Benign essential HTN          Plan:    Plan:  MRI  ECHO  Follow exam

## 2018-04-28 NOTE — SUBJECTIVE & OBJECTIVE
Past Medical History:   Diagnosis Date    Acid reflux     Coronary artery disease due to calcified coronary lesion 7/13/2016    nonobstructive    Hypertension     Melanoma 1994    Melanoma of back     Mitral valve prolapse      Past Surgical History:   Procedure Laterality Date    APPENDECTOMY      APPENDECTOMY      BREAST BIOPSY Right     benign excisional biopsy    CARDIAC CATHETERIZATION      CHOLECYSTECTOMY      COLONOSCOPY      HYSTERECTOMY  1982    MANDIBLE SURGERY      OOPHORECTOMY Bilateral 1982    TONSILLECTOMY      TUBAL LIGATION       Family History   Problem Relation Age of Onset    Hypertension Mother     No Known Problems Father      Social History   Substance Use Topics    Smoking status: Never Smoker    Smokeless tobacco: Never Used    Alcohol use No     Review of patient's allergies indicates:   Allergen Reactions    Ciprofloxacin Hives and Swelling     Swelling in throat    Bactrim [sulfamethoxazole-trimethoprim] Rash    Sulfur Hives    Toprol xl [metoprolol succinate] Other (See Comments)     Profuse sweating       Medications: I have reviewed the current medication administration record.      Current Outpatient Prescriptions:     aspirin (ECOTRIN) 81 MG EC tablet, Take 81 mg by mouth once daily., Disp: , Rfl:     atenolol (TENORMIN) 25 MG tablet, Take 1 tablet (25 mg total) by mouth once daily., Disp: 30 tablet, Rfl: 5    benazepril (LOTENSIN) 10 MG tablet, Take 1 tablet (10 mg total) by mouth once daily. (Patient taking differently: Take 20 mg by mouth once daily. ), Disp: 90 tablet, Rfl: 1    CALCIUM CARBONATE (CALCIUM 500 ORAL), Take 1,200 mg by mouth once daily., Disp: , Rfl:     cholecalciferol, vitamin D3, (VITAMIN D3) 2,000 unit Cap, Take 1 capsule by mouth once daily., Disp: , Rfl:     cloNIDine (CATAPRES) 0.1 MG tablet, Take 1 tablet (0.1 mg total) by mouth 3 (three) times daily., Disp: 90 tablet, Rfl: 11    escitalopram oxalate (LEXAPRO) 20 MG tablet,  TAKE ONE TABLET BY MOUTH ONCE DAILY, Disp: 90 tablet, Rfl: 3    fluticasone (FLONASE) 50 mcg/actuation nasal spray, 1 spray by Nasal route as needed. , Disp: , Rfl: 4    FLUZONE HIGH-DOSE 2017-18, PF, 180 mcg/0.5 mL vaccine, ADM 0.5ML IM UTD, Disp: , Rfl: 0    furosemide (LASIX) 20 MG tablet, TAKE 1 TABLET(20 MG) BY MOUTH EVERY DAY (Patient taking differently: TAKE 1 TABLET(20 MG) BY MOUTH EVERY DAY prn), Disp: 30 tablet, Rfl: 0    hydroCHLOROthiazide (HYDRODIURIL) 25 MG tablet, Take 1 tablet (25 mg total) by mouth once daily., Disp: 30 tablet, Rfl: 5    hydrocodone-acetaminophen 5-325mg (NORCO) 5-325 mg per tablet, Take 1 tablet by mouth every 4 (four) hours as needed for Pain., Disp: 18 tablet, Rfl: 0    ondansetron (ZOFRAN-ODT) 4 MG TbDL, Take 1 tablet (4 mg total) by mouth every 8 (eight) hours as needed., Disp: 20 tablet, Rfl: 0    triamcinolone acetonide 0.1% (KENALOG) 0.1 % cream, Apply topically 2 (two) times daily., Disp: 15 g, Rfl: 0    warfarin (COUMADIN) 5 MG tablet, Take 1 tablet (5 mg total) by mouth Daily., Disp: 30 tablet, Rfl: 6      Review of Systems   Constitutional: Negative for chills and fever.   HENT: Negative for drooling and trouble swallowing.    Eyes: Negative for redness and visual disturbance.   Respiratory: Negative for cough and shortness of breath.    Cardiovascular: Negative for chest pain and palpitations.   Gastrointestinal: Negative for abdominal pain, nausea and vomiting.   Endocrine: Negative for cold intolerance and heat intolerance.   Genitourinary: Positive for frequency. Negative for dysuria.   Musculoskeletal: Negative for neck pain and neck stiffness.   Skin: Negative for rash and wound.   Allergic/Immunologic: Negative for environmental allergies and food allergies.   Neurological: Positive for facial asymmetry, speech difficulty, weakness and headaches. Negative for dizziness and numbness.   Hematological: Negative for adenopathy. Does not bruise/bleed easily.    Psychiatric/Behavioral: Negative for agitation.     Objective:     Vital Signs (Most Recent):  Temp: 98.3 °F (36.8 °C) (04/27/18 2300)  Pulse: (!) 119 (04/28/18 0031)  Resp: 18 (04/28/18 0031)  BP: (!) 145/110 (04/28/18 0031)  SpO2: 95 % (04/28/18 0031)    Vital Signs Range (Last 24H):  Temp:  [98.3 °F (36.8 °C)-98.9 °F (37.2 °C)]   Pulse:  []   Resp:  [14-18]   BP: (140-189)/()   SpO2:  [95 %-97 %]     Physical Exam   Constitutional: She is oriented to person, place, and time. She appears well-developed and well-nourished. No distress.   HENT:   Head: Normocephalic and atraumatic.   Right Ear: External ear normal.   Left Ear: External ear normal.   Nose: Nose normal.   Mouth/Throat: Oropharynx is clear and moist. No oropharyngeal exudate.   Eyes: Conjunctivae and EOM are normal. Pupils are equal, round, and reactive to light. Right eye exhibits no discharge. Left eye exhibits no discharge. No scleral icterus.   Neck: Normal range of motion. Neck supple. No thyromegaly present.   Cardiovascular: Normal rate, regular rhythm and normal heart sounds.    No murmur heard.  Pulmonary/Chest: Effort normal and breath sounds normal. No respiratory distress. She has no wheezes. She has no rhonchi. She has no rales.   Abdominal: Soft. Bowel sounds are normal. She exhibits no distension. There is no hepatosplenomegaly. There is no tenderness.   Musculoskeletal: She exhibits no edema.   Lymphadenopathy:     She has no cervical adenopathy.   Neurological: She is alert and oriented to person, place, and time.   Skin: Skin is warm and dry. Capillary refill takes less than 2 seconds. She is not diaphoretic.   Psychiatric: She has a normal mood and affect.   Nursing note and vitals reviewed.      Neurological Exam:   LOC: alert  Attention Span: Good   Language: Expressive aphasia, Naming impaired  Articulation: No dysarthria  Visual Fields: Full  EOM (CN III, IV, VI): Full/intact  Pupils (CN II, III): PERRL  Facial  Sensation (CN V): Normal  Motor: Arm left  Normal 5/5  Leg left  Normal 5/5  Arm right  Normal 5/5  Leg right Normal 5/5  Cebellar: No evidence of appendicular or axial ataxia  Sensation: Intact to light touch, temperature and vibration      Laboratory:  CMP:   Recent Labs  Lab 04/27/18 2328   CALCIUM 9.1   ALBUMIN 2.9*   PROT 5.7*      K 3.9   CO2 20*      BUN 15   CREATININE 0.8   ALKPHOS 53*   ALT 11   AST 20   BILITOT 0.3     CBC:   Recent Labs  Lab 04/27/18 2328   WBC 10.93   RBC 4.26   HGB 12.2   HCT 37.5      MCV 88   MCH 28.6   MCHC 32.5     Lipid Panel:   Recent Labs  Lab 04/27/18 2328   CHOL 124   LDLCALC 58.6*   HDL 43   TRIG 112     Coagulation:   Recent Labs  Lab 04/27/18 2328   INR 1.2   APTT <21.0     Hgb A1C:   Recent Labs  Lab 04/28/18  0028   HGBA1C 5.3     TSH:   Recent Labs  Lab 04/27/18 2328   TSH 3.750       Diagnostic Results:      Brain imaging:  CTH 4/27/18 No acute findings    MRI Brain pending    Vessel Imaging:  CTA MP 4/27/18 negative for vessel occlusion or stenosis.    Cardiac Evaluation:   2D Echo pending

## 2018-04-28 NOTE — CONSULTS
Inpatient consult to Physical Medicine Rehab  Consult performed by: SATNAM STEPHENS  Consult ordered by: JACEY SOSA  Reason for consult: assess rehab needs        Reviewed patient history and current admission.  Rehab team following.  Full consult to follow.    MAGED Davey, FNP-C  Physical Medicine & Rehabilitation   04/28/2018  Spectralink: [id number]

## 2018-04-28 NOTE — H&P
History & Physical  Neurocritical Care    Admit Date: 4/27/2018  LOS: 0    Code Status: Full Code     CC: <principal problem not specified>    SUBJECTIVE:     History of Present Illness: Pt. si a 74 yo F with pmh of CAD, HTN, and a-fib who presents from OSH s/p TPA for acute onset aphasia. Pt. Was at home around 19:30 when she noted symptom onset.  was home at the time and she was able to communicate with him, however difficulty with word finding. She denies any weakness or change to sensation. She was able to ambulate without assistance. EMS was called and patient was transported to OSH shortly after symptom onset. After arrival to OSH, telestroke was consulted and tPa was administered at 21:01.     Pt. Was diagnosed with A-fib by her cardiologist ~ one and a half months ago. She was started on coumadin, however states that she had not received any additional blood work (presumably coumadin clinic) and thus stopped taking her coumadin. Pt. Takes ASA 81mg QHS.       Past Medical History:   Diagnosis Date    Acid reflux     Coronary artery disease due to calcified coronary lesion 7/13/2016    nonobstructive    Hypertension     Melanoma 1994    Melanoma of back     Mitral valve prolapse      Past Surgical History:   Procedure Laterality Date    APPENDECTOMY      APPENDECTOMY      BREAST BIOPSY Right     benign excisional biopsy    CARDIAC CATHETERIZATION      CHOLECYSTECTOMY      COLONOSCOPY      HYSTERECTOMY  1982    MANDIBLE SURGERY      OOPHORECTOMY Bilateral 1982    TONSILLECTOMY      TUBAL LIGATION       Current Facility-Administered Medications on File Prior to Encounter   Medication Dose Route Frequency Provider Last Rate Last Dose    [COMPLETED] alteplase (ACTIVASE) 100 mg injection 57 mg  0.81 mg/kg Intravenous ED 1 Time Haydee Quinteros MD 57 mL/hr at 04/27/18 2102 57 mg at 04/27/18 2102    [COMPLETED] ALTEPLASE IV BOLUS bolus from vial 6 mg  0.09 mg/kg Intravenous ED 1 Time Haydee PRECIADO  MD [place]   Stopped at 18 2200     Current Outpatient Prescriptions on File Prior to Encounter   Medication Sig Dispense Refill    [] amoxicillin-clavulanate 875-125mg (AUGMENTIN) 875-125 mg per tablet Take 1 tablet by mouth 3 (three) times daily. 21 tablet 0    aspirin (ECOTRIN) 81 MG EC tablet Take 81 mg by mouth once daily.      atenolol (TENORMIN) 25 MG tablet Take 1 tablet (25 mg total) by mouth once daily. 30 tablet 5    benazepril (LOTENSIN) 10 MG tablet Take 1 tablet (10 mg total) by mouth once daily. (Patient taking differently: Take 20 mg by mouth once daily. ) 90 tablet 1    CALCIUM CARBONATE (CALCIUM 500 ORAL) Take 1,200 mg by mouth once daily.      cholecalciferol, vitamin D3, (VITAMIN D3) 2,000 unit Cap Take 1 capsule by mouth once daily.      cloNIDine (CATAPRES) 0.1 MG tablet Take 1 tablet (0.1 mg total) by mouth 3 (three) times daily. 90 tablet 11    escitalopram oxalate (LEXAPRO) 20 MG tablet TAKE ONE TABLET BY MOUTH ONCE DAILY 90 tablet 3    fluticasone (FLONASE) 50 mcg/actuation nasal spray 1 spray by Nasal route as needed.   4    FLUZONE HIGH-DOSE , PF, 180 mcg/0.5 mL vaccine ADM 0.5ML IM UTD  0    furosemide (LASIX) 20 MG tablet TAKE 1 TABLET(20 MG) BY MOUTH EVERY DAY (Patient taking differently: TAKE 1 TABLET(20 MG) BY MOUTH EVERY DAY prn) 30 tablet 0    hydroCHLOROthiazide (HYDRODIURIL) 25 MG tablet Take 1 tablet (25 mg total) by mouth once daily. 30 tablet 5    hydrocodone-acetaminophen 5-325mg (NORCO) 5-325 mg per tablet Take 1 tablet by mouth every 4 (four) hours as needed for Pain. 18 tablet 0    [] metroNIDAZOLE (FLAGYL) 500 MG tablet Take 1 tablet (500 mg total) by mouth 3 (three) times daily. 21 tablet 0    ondansetron (ZOFRAN-ODT) 4 MG TbDL Take 1 tablet (4 mg total) by mouth every 8 (eight) hours as needed. 20 tablet 0    triamcinolone acetonide 0.1% (KENALOG) 0.1 % cream Apply topically 2 (two) times daily. 15 g 0    warfarin (COUMADIN) 5  MG tablet Take 1 tablet (5 mg total) by mouth Daily. 30 tablet 6     Review of patient's allergies indicates:   Allergen Reactions    Ciprofloxacin Hives and Swelling     Swelling in throat    Bactrim [sulfamethoxazole-trimethoprim] Rash    Sulfur Hives    Toprol xl [metoprolol succinate] Other (See Comments)     Profuse sweating     Family History   Problem Relation Age of Onset    Hypertension Mother     No Known Problems Father      Social History   Substance Use Topics    Smoking status: Never Smoker    Smokeless tobacco: Never Used    Alcohol use No      Review of Symptoms:  Constitutional: Denies fevers, weight loss, chills, or weakness.  Eyes: Denies changes in vision.  ENT: Denies dysphagia, nasal discharge, ear pain or discharge.  Cardiovascular: Denies chest pain, palpitations, orthopnea, or claudication.  Respiratory: Denies shortness of breath, cough, hemoptysis, or wheezing.  GI: Denies nausea/vomitting, hematochezia, melena, abd pain, or changes in appetite.  : Denies dysuria, incontinence, or hematuria.  Musculoskeletal: Denies joint pain or myalgias.  Skin/breast: Denies rashes, lumps, lesions, or discharge.  Neurologic: Denies headache, dizziness, vertigo, or paresthesias.  Psychiatric: Denies changes in mood or hallucinations.  Endocrine: Denies polyuria, polydipsia, heat/cold intolerance.  Hematologic/Lymph: Denies lymphadenopathy, easy bruising or easy bleeding.  Allergic/Immunologic: Denies rash, rhinitis.     OBJECTIVE:   Vital Signs (Most Recent):   Temp: 98.3 °F (36.8 °C) (04/27/18 2300)  Pulse: (!) 125 (04/27/18 2346)  Resp: 17 (04/27/18 2346)  BP: (!) 169/72 (04/27/18 2346)  SpO2: 95 % (04/27/18 2346)    Vital Signs (24h Range):   Temp:  [98.3 °F (36.8 °C)-98.9 °F (37.2 °C)] 98.3 °F (36.8 °C)  Pulse:  [] 125  Resp:  [14-18] 17  SpO2:  [95 %-97 %] 95 %  BP: (140-189)/() 169/72    ICP/CPP (Last 24h):        I & O (Last 24h):  No intake or output data in the 24 hours  ending 18  Physical Exam:  GA: Alert, comfortable, no acute distress.   HEENT: No scleral icterus or JVD.   Pulmonary: Clear to auscultation A/P/L. No wheezing, crackles, or rhonchi.  Cardiac: RRR S1 & S2 w/o rubs/murmurs/gallops.   Abdominal: Bowel sounds present x 4. No appreciable hepatosplenomegaly.  Skin: No jaundice, rashes, or visible lesions.  Neuro:  --GCS: E4 V6 M5  --Mental Status:  AOx3  --CN II-XII grossly intact.   --Pupils 5mm, PERRL.   --Corneal reflex, gag, cough intact.  --LUE strength: 5/5  --RUE strength: 5/5  --LLE strength: 5/5  --RLE strength: 5/5    Vent Data:        Lines/Drains/Airway:          Nutrition/Tube Feeds:   Current Diet Order   Procedures    Diet NPO     No food intake until passes HIREN or evaluated by speech.       Labs:  ABG: No results for input(s): PH, PO2, PCO2, HCO3, POCSATURATED, BE in the last 24 hours.  BMP:  Recent Labs  Lab 18      K 4.8      CO2 21*   BUN 17   CREATININE 0.83   *     LFT: Lab Results   Component Value Date    AST 38 (H) 2018    ALT 30 2018    ALKPHOS 58 2018    BILITOT 0.5 2018    ALBUMIN 4.0 2018    PROT 7.1 2018     CBC:   Lab Results   Component Value Date    WBC 10.93 2018    HGB 12.2 2018    HCT 37.5 2018    MCV 88 2018     2018     Microbiology x 7d:   Microbiology Results (last 7 days)     ** No results found for the last 168 hours. **        Imagin18  20:32    FINDINGS     No acute hemorrhage, midline shift, mass effect, or extra-axial collection.   The ventricles are globally proportionally prominent compatible with mild   moderate involutional changes. There are mild patchy areas of decreased   attenuation in the periventricular and subcortical supratentorial white   matter, while nonspecific, most commonly sequela of chronic microvascular   ischemia. The visualized osseous structures are intact. The visualized    paranasal sinuses and mastoid air cells are clear. Intracranial vascular   calcifications are seen.     IMPRESSION     No acute intracranial abnormality. Chronic changes as described above.   Discussed with Dr. Quinteros 8:44 p.m.     I personally reviewed the above image.    ASSESSMENT/PLAN:     Patient Active Problem List    Diagnosis Date Noted    Embolic stroke involving left middle cerebral artery 04/27/2018    S/P admn tPA in diff fac w/n last 24 hr bef adm to crnt fac 04/27/2018    Stroke 04/27/2018    AF (paroxysmal atrial fibrillation)     History of polymyalgia rheumatica 06/19/2017    GERD (gastroesophageal reflux disease) 11/15/2016    Coronary artery disease due to calcified coronary lesion 07/13/2016    Carotid arterial disease 04/22/2014    Allergic rhinitis 11/04/2013    Hyperlipidemia 07/15/2013    Obstructive sleep apnea 07/15/2013    Anxiety, generalized 09/27/2012    Benign essential HTN 09/12/2011    H/O Malignant melanoma 09/12/2011    Billowing mitral valve 09/12/2011    Osteopenia 09/12/2011     Neuro: S/p TPA for aphasia  - q 1 hr neuro checks  - Vascular neurology consulted; appreciate recs  - Currently, neuro intact  - SBP <180  - F/u MRI non-con brain post TPA eval      Pulmonary:   - No acute issues  - Maintaining oxygen saturation on room air     Cardiac:   AFib   - Rate control - labetalol PRN  - HDS  - TTE pending   - SBP <180     Renal:    - Strict I & O's  - BUN/Cr daily     ID:   - No signs or symptoms of infection  - CBC daily     Hem/Onc:   - H/H WNL; daily CBC  - F/u lipid panel   - F/u coags     Endocrine:    - No acute issues  - TSH pending  - Sliding scale insulin PRN     Fluids/Electrolytes/Nutrition/GI:   - Maintenance IVF at 75 mL/hr  - Replete lytes PRN  - NPO at this time     Dionicio Rollins  CA2  P: [phone]

## 2018-04-28 NOTE — PROGRESS NOTES
Patient returned from MRI on a portable monitor per RN. Ambu bag in bed. No acute events during transfer. RN will continue to monitor.

## 2018-04-29 PROBLEM — K57.92 DIVERTICULITIS: Status: ACTIVE | Noted: 2018-04-29

## 2018-04-29 LAB
ALBUMIN SERPL BCP-MCNC: 3.1 G/DL
ALP SERPL-CCNC: 55 U/L
ALT SERPL W/O P-5'-P-CCNC: 9 U/L
ANION GAP SERPL CALC-SCNC: 8 MMOL/L
AST SERPL-CCNC: 18 U/L
BASOPHILS # BLD AUTO: 0.07 K/UL
BASOPHILS NFR BLD: 0.9 %
BILIRUB SERPL-MCNC: 0.7 MG/DL
BUN SERPL-MCNC: 9 MG/DL
CALCIUM SERPL-MCNC: 9.1 MG/DL
CHLORIDE SERPL-SCNC: 110 MMOL/L
CO2 SERPL-SCNC: 23 MMOL/L
CREAT SERPL-MCNC: 0.8 MG/DL
DIFFERENTIAL METHOD: ABNORMAL
EOSINOPHIL # BLD AUTO: 0.1 K/UL
EOSINOPHIL NFR BLD: 1.7 %
ERYTHROCYTE [DISTWIDTH] IN BLOOD BY AUTOMATED COUNT: 14.6 %
EST. GFR  (AFRICAN AMERICAN): >60 ML/MIN/1.73 M^2
EST. GFR  (NON AFRICAN AMERICAN): >60 ML/MIN/1.73 M^2
GLUCOSE SERPL-MCNC: 103 MG/DL
HCT VFR BLD AUTO: 39.1 %
HGB BLD-MCNC: 12.5 G/DL
IMM GRANULOCYTES # BLD AUTO: 0.03 K/UL
IMM GRANULOCYTES NFR BLD AUTO: 0.4 %
INR PPP: 1.2
LYMPHOCYTES # BLD AUTO: 1.9 K/UL
LYMPHOCYTES NFR BLD: 23.4 %
MAGNESIUM SERPL-MCNC: 1.9 MG/DL
MCH RBC QN AUTO: 28.3 PG
MCHC RBC AUTO-ENTMCNC: 32 G/DL
MCV RBC AUTO: 89 FL
MONOCYTES # BLD AUTO: 0.7 K/UL
MONOCYTES NFR BLD: 8.7 %
NEUTROPHILS # BLD AUTO: 5.2 K/UL
NEUTROPHILS NFR BLD: 64.9 %
NRBC BLD-RTO: 0 /100 WBC
PHOSPHATE SERPL-MCNC: 2.6 MG/DL
PLATELET # BLD AUTO: 329 K/UL
PMV BLD AUTO: 9.8 FL
POCT GLUCOSE: 102 MG/DL (ref 70–110)
POCT GLUCOSE: 117 MG/DL (ref 70–110)
POCT GLUCOSE: 87 MG/DL (ref 70–110)
POCT GLUCOSE: 92 MG/DL (ref 70–110)
POTASSIUM SERPL-SCNC: 4.4 MMOL/L
PROT SERPL-MCNC: 6.1 G/DL
PROTHROMBIN TIME: 12.2 SEC
RBC # BLD AUTO: 4.41 M/UL
SODIUM SERPL-SCNC: 141 MMOL/L
WBC # BLD AUTO: 8.05 K/UL

## 2018-04-29 PROCEDURE — 63600175 PHARM REV CODE 636 W HCPCS: Performed by: PHYSICIAN ASSISTANT

## 2018-04-29 PROCEDURE — 99233 SBSQ HOSP IP/OBS HIGH 50: CPT | Mod: ,,, | Performed by: PHYSICIAN ASSISTANT

## 2018-04-29 PROCEDURE — A4216 STERILE WATER/SALINE, 10 ML: HCPCS | Performed by: STUDENT IN AN ORGANIZED HEALTH CARE EDUCATION/TRAINING PROGRAM

## 2018-04-29 PROCEDURE — 97165 OT EVAL LOW COMPLEX 30 MIN: CPT

## 2018-04-29 PROCEDURE — 85025 COMPLETE CBC W/AUTO DIFF WBC: CPT

## 2018-04-29 PROCEDURE — 25000003 PHARM REV CODE 250: Performed by: ANESTHESIOLOGY

## 2018-04-29 PROCEDURE — 25000003 PHARM REV CODE 250: Performed by: PHYSICIAN ASSISTANT

## 2018-04-29 PROCEDURE — 25500020 PHARM REV CODE 255: Performed by: PSYCHIATRY & NEUROLOGY

## 2018-04-29 PROCEDURE — 85610 PROTHROMBIN TIME: CPT

## 2018-04-29 PROCEDURE — 20600001 HC STEP DOWN PRIVATE ROOM

## 2018-04-29 PROCEDURE — 83735 ASSAY OF MAGNESIUM: CPT

## 2018-04-29 PROCEDURE — 94761 N-INVAS EAR/PLS OXIMETRY MLT: CPT

## 2018-04-29 PROCEDURE — 25000003 PHARM REV CODE 250: Performed by: STUDENT IN AN ORGANIZED HEALTH CARE EDUCATION/TRAINING PROGRAM

## 2018-04-29 PROCEDURE — 84100 ASSAY OF PHOSPHORUS: CPT

## 2018-04-29 PROCEDURE — 63600175 PHARM REV CODE 636 W HCPCS: Performed by: ANESTHESIOLOGY

## 2018-04-29 PROCEDURE — 80053 COMPREHEN METABOLIC PANEL: CPT

## 2018-04-29 PROCEDURE — 25500020 PHARM REV CODE 255: Performed by: STUDENT IN AN ORGANIZED HEALTH CARE EDUCATION/TRAINING PROGRAM

## 2018-04-29 PROCEDURE — 99233 SBSQ HOSP IP/OBS HIGH 50: CPT | Mod: GC,,, | Performed by: PSYCHIATRY & NEUROLOGY

## 2018-04-29 RX ORDER — ASPIRIN 81 MG/1
81 TABLET ORAL DAILY
Status: DISCONTINUED | OUTPATIENT
Start: 2018-04-29 | End: 2018-05-02

## 2018-04-29 RX ORDER — HEPARIN SODIUM 5000 [USP'U]/ML
5000 INJECTION, SOLUTION INTRAVENOUS; SUBCUTANEOUS EVERY 8 HOURS
Status: DISCONTINUED | OUTPATIENT
Start: 2018-04-29 | End: 2018-05-02 | Stop reason: HOSPADM

## 2018-04-29 RX ORDER — FENTANYL CITRATE 50 UG/ML
25 INJECTION, SOLUTION INTRAMUSCULAR; INTRAVENOUS ONCE
Status: COMPLETED | OUTPATIENT
Start: 2018-04-29 | End: 2018-04-29

## 2018-04-29 RX ORDER — ACETAMINOPHEN 10 MG/ML
1000 INJECTION, SOLUTION INTRAVENOUS ONCE
Status: COMPLETED | OUTPATIENT
Start: 2018-04-29 | End: 2018-04-29

## 2018-04-29 RX ORDER — FENTANYL CITRATE 50 UG/ML
INJECTION, SOLUTION INTRAMUSCULAR; INTRAVENOUS
Status: DISPENSED
Start: 2018-04-29 | End: 2018-04-29

## 2018-04-29 RX ORDER — IBUPROFEN 400 MG/1
400 TABLET ORAL ONCE
Status: COMPLETED | OUTPATIENT
Start: 2018-04-29 | End: 2018-04-29

## 2018-04-29 RX ORDER — PROMETHAZINE HYDROCHLORIDE 12.5 MG/1
12.5 TABLET ORAL EVERY 6 HOURS PRN
Status: DISCONTINUED | OUTPATIENT
Start: 2018-04-29 | End: 2018-04-30

## 2018-04-29 RX ORDER — BENAZEPRIL HYDROCHLORIDE 10 MG/1
10 TABLET ORAL DAILY
Status: DISCONTINUED | OUTPATIENT
Start: 2018-04-29 | End: 2018-05-02 | Stop reason: HOSPADM

## 2018-04-29 RX ORDER — WARFARIN SODIUM 5 MG/1
5 TABLET ORAL DAILY
Status: DISCONTINUED | OUTPATIENT
Start: 2018-04-29 | End: 2018-04-29

## 2018-04-29 RX ADMIN — BENAZEPRIL HYDROCHLORIDE 10 MG: 10 TABLET, FILM COATED ORAL at 12:04

## 2018-04-29 RX ADMIN — ATORVASTATIN CALCIUM 40 MG: 20 TABLET, FILM COATED ORAL at 08:04

## 2018-04-29 RX ADMIN — POTASSIUM & SODIUM PHOSPHATES POWDER PACK 280-160-250 MG 2 PACKET: 280-160-250 PACK at 05:04

## 2018-04-29 RX ADMIN — IOHEXOL 15 ML: 350 INJECTION, SOLUTION INTRAVENOUS at 05:04

## 2018-04-29 RX ADMIN — ONDANSETRON 4 MG: 2 INJECTION INTRAMUSCULAR; INTRAVENOUS at 01:04

## 2018-04-29 RX ADMIN — IBUPROFEN 400 MG: 400 TABLET, FILM COATED ORAL at 06:04

## 2018-04-29 RX ADMIN — Medication 3 ML: at 01:04

## 2018-04-29 RX ADMIN — ALUMINUM HYDROXIDE, MAGNESIUM HYDROXIDE, AND SIMETHICONE 50 ML: 200; 200; 20 SUSPENSION ORAL at 09:04

## 2018-04-29 RX ADMIN — ASPIRIN 81 MG: 81 TABLET, COATED ORAL at 09:04

## 2018-04-29 RX ADMIN — ACETAMINOPHEN 1000 MG: 10 INJECTION, SOLUTION INTRAVENOUS at 02:04

## 2018-04-29 RX ADMIN — HEPARIN SODIUM 5000 UNITS: 5000 INJECTION, SOLUTION INTRAVENOUS; SUBCUTANEOUS at 10:04

## 2018-04-29 RX ADMIN — FENTANYL CITRATE 25 MCG: 50 INJECTION, SOLUTION INTRAMUSCULAR; INTRAVENOUS at 12:04

## 2018-04-29 RX ADMIN — HEPARIN SODIUM 5000 UNITS: 5000 INJECTION, SOLUTION INTRAVENOUS; SUBCUTANEOUS at 01:04

## 2018-04-29 RX ADMIN — ATENOLOL 25 MG: 25 TABLET ORAL at 08:04

## 2018-04-29 RX ADMIN — IOHEXOL 100 ML: 350 INJECTION, SOLUTION INTRAVENOUS at 08:04

## 2018-04-29 RX ADMIN — Medication 3 ML: at 10:04

## 2018-04-29 RX ADMIN — POTASSIUM & SODIUM PHOSPHATES POWDER PACK 280-160-250 MG 2 PACKET: 280-160-250 PACK at 09:04

## 2018-04-29 NOTE — PROGRESS NOTES
[facility]  Vascular Neurology  Comprehensive Stroke Center  Progress Note    Assessment/Plan:     * Embolic stroke involving left middle cerebral artery    73 y.o. female with significant past medical history of GERD, HTN, CAD, Afib, and diverticulitis presented to hospital as a transfer from [facility] ED for evaluation of L MCA stroke symptoms.  Patient received tPA.  CTA MP negative for LVO. Source is likely thromboembolic as patient has A Fib and stopped taking coumadin about 3 weeks ago.    MRI brain shows small subacute left temporal parietal lobe infarct.  No mass effect or intracranial hemorrhage. Expressive aphasia resolved. Right UE and LE strength improved. Can be stepped down to floor    Antithrombotics for secondary stroke prevention: Antiplatelets: Aspirin: 81 mg daily  Anticoagulants: Warfarin INR adjusted target    Statins for secondary stroke prevention and hyperlipidemia, if present:   Statins: Atorvastatin- 40 mg daily    Aggressive risk factor modification: HTN, A-Fib     Rehab efforts: PT/OT/SLP to evaluate and treat    Diagnostics ordered/pending: none    VTE prophylaxis: Heparin 5000 units SQ every 8 hours    BP parameters: Infarct: Post tPA, SBP <180            S/P admn tPA in diff fac w/n last 24 hr bef adm to crnt fac    -Patient admitted to [facility] for close montioring.        AF (paroxysmal atrial fibrillation)    - Stroke risk factor.  Patient newly diagnosed w/Afib.  Reportedly stopped taking coumadin about 3 weeks ago due to concerns about bleeding  - source of stroke was likely thromboembolic   - rate controlled on atenolol (home med)  - Can resume coumadin. Will need bridging with heparin         Hyperlipidemia    -Stroke risk factor.  LDL 58.6.  -Atorvastatin 20 mg daily        Benign essential HTN    -Stroke risk factor.  SBP<180.          Carotid arterial disease    -Stroke risk factor.  No hemodynamically significant stenosis on CTA MP.              4/27: Admitted to [facility] s/p tPA at OSH for L MCA stroke symptoms, likely thromboembolic in origin. CTH and CTA head/neck showed no acute abnormality, no high-grade stenosis or major vessel occlusion  4/28: Aphasia improved overnight. Patient with mild residual expressive. Improvement in right sided strength. HR in 120s. Holding anti-coagulation due to recent tPA  4/29: No acute events. MRI brain showed small subacute left temporal parietal lobe infarct, no mass effect or intracranial hemorrhage. Further improvement in speech. Expressive aphasia has resolved. Right UE/LE strength improved. HR better controlled with atenolol (home med). Can re-start anticoagulation. Stepdown to floor    STROKE DOCUMENTATION   Acute Stroke Times   Last Known Normal Date: 04/27/18  Last Known Normal Time: 1930  Symptom Onset Date: 04/27/18  Symptom Onset Time: 1930  Stroke Team Called Date: 04/27/18  Stroke Team Called Time: 2020  Stroke Team Arrival Date: 04/27/18  Stroke Team Arrival Time: 2022  CT Interpretation Time: 2030  Decision to Treat Time for Alteplase: 2045  Decision to Treat Time for IR: 0000 (not an IR candidate)    NIH Scale:  1a. Level Of Consciousness: 0-->Alert: keenly responsive  1b. LOC Questions: 0-->Answers both questions correctly  1c. LOC Commands: 0-->Performs both tasks correctly  2. Best Gaze: 0-->Normal  3. Visual: 0-->No visual loss  4. Facial Palsy: 0-->Normal symmetrical movements  5a. Motor Arm, Left: 0-->No drift: limb holds 90 (or 45) degrees for full 10 secs  5b. Motor Arm, Right: 0-->No drift: limb holds 90 (or 45) degrees for full 10 secs  6a. Motor Leg, Left: 0-->No drift: leg holds 30 degree position for full 5 secs  6b. Motor Leg, Right: 0-->No drift: leg holds 30 degree position for full 5 secs  7. Limb Ataxia: 0-->Absent  8. Sensory: 0-->Normal: no sensory loss  9. Best Language: 0-->No aphasia: normal  10. Dysarthria: 0-->Normal  11. Extinction and Inattention (formerly Neglect): 0-->No  abnormality  Total (NIH Stroke Scale): 0       Modified Russell Score: 1  [place] Coma Scale:15   ABCD2 Score:    [id number] Score:5  HAS -BLED Score:3  ICH Score:   Hunt & Garber Classification:      Hemorrhagic change of an Ischemic Stroke: Does this patient have an ischemic stroke with hemorrhagic changes? No     Neurologic Chief Complaint: aphasia, right hemiparesis    Subjective:     Interval History: Patient is seen for follow-up neurological assessment and treatment recommendations:     Further improvement in speech. Expressive aphasia has resolved. Right UE/LE strength improved. Can re-start anticoagulation. Stepdown to floor    HPI, Past Medical, Family, and Social History remains the same as documented in the initial encounter.     Review of Systems   Constitutional: Negative for chills and fever.   HENT: Negative for drooling and trouble swallowing.    Eyes: Negative for redness and visual disturbance.   Respiratory: Negative for cough and shortness of breath.    Cardiovascular: Negative for chest pain and palpitations.   Gastrointestinal: Negative for abdominal pain, nausea and vomiting.   Endocrine: Negative for cold intolerance and heat intolerance.   Genitourinary: Negative for dysuria and frequency.   Musculoskeletal: Negative for neck pain and neck stiffness.   Skin: Negative for rash and wound.   Allergic/Immunologic: Negative for environmental allergies and food allergies.   Neurological: Negative for dizziness, facial asymmetry, speech difficulty, weakness, numbness and headaches.   Hematological: Negative for adenopathy. Does not bruise/bleed easily.   Psychiatric/Behavioral: Negative for agitation.     Scheduled Meds:   aspirin  81 mg Oral Daily    atenolol  25 mg Oral Daily    atorvastatin  40 mg Oral Daily    benazepril  10 mg Oral Daily    fentaNYL        heparin (porcine)  5,000 Units Subcutaneous Q8H    sodium chloride 0.9%  3 mL Intravenous Q8H     Continuous Infusions:    PRN  Meds:acetaminophen, dextrose 50%, glucagon (human recombinant), hydrALAZINE, insulin aspart U-100, labetalol, magnesium oxide, magnesium oxide, ondansetron, polyethylene glycol, potassium chloride 10%, potassium chloride 10%, potassium chloride 10%, potassium, sodium phosphates, potassium, sodium phosphates, potassium, sodium phosphates, promethazine (PHENERGAN) IVPB    Objective:     Vital Signs (Most Recent):  Temp: 98 °F (36.7 °C) (04/29/18 1105)  Pulse: 80 (04/29/18 1205)  Resp: (!) 26 (04/29/18 1205)  BP: (!) 181/79 (04/29/18 1205)  SpO2: 97 % (04/29/18 1205)  BP Location: Left arm    Vital Signs Range (Last 24H):  Temp:  [98 °F (36.7 °C)-98.9 °F (37.2 °C)]   Pulse:  []   Resp:  [11-29]   BP: (110-181)/()   SpO2:  [93 %-98 %]   BP Location: Left arm    Physical Exam   Constitutional: She is oriented to person, place, and time. She appears well-developed and well-nourished. No distress.   HENT:   Head: Normocephalic and atraumatic.   Right Ear: External ear normal.   Left Ear: External ear normal.   Nose: Nose normal.   Mouth/Throat: Oropharynx is clear and moist. No oropharyngeal exudate.   Eyes: Conjunctivae and EOM are normal. Pupils are equal, round, and reactive to light. Right eye exhibits no discharge. Left eye exhibits no discharge. No scleral icterus.   Neck: Normal range of motion. Neck supple. No thyromegaly present.   Cardiovascular: Normal rate, regular rhythm and normal heart sounds.    No murmur heard.  Pulmonary/Chest: Effort normal and breath sounds normal. No respiratory distress. She has no wheezes. She has no rhonchi. She has no rales.   Abdominal: Soft. Bowel sounds are normal. She exhibits no distension. There is no hepatosplenomegaly. There is no tenderness.   Musculoskeletal: She exhibits no edema.   Lymphadenopathy:     She has no cervical adenopathy.   Neurological: She is alert and oriented to person, place, and time.   Skin: Skin is warm and dry. Capillary refill takes  less than 2 seconds. She is not diaphoretic.   Psychiatric: She has a normal mood and affect.   Nursing note and vitals reviewed.      Neurological Exam:   LOC: alert  Attention Span: Good   Language: No aphasia  Articulation: No dysarthria  Orientation: Person, Place, Time   Visual Fields: Full  EOM (CN III, IV, VI): Full/intact  Pupils (CN II, III): PERRL  Facial Sensation (CN V): Normal  Facial Movement (CN VII): Symmetric facial expression    Motor: Arm left  Normal 5/5  Leg left  Normal 5/5  Arm right  Normal 5/5  Leg right Paresis: 4/5  Cebellar: No evidence of appendicular or axial ataxia  Sensation: Intact to light touch, temperature and vibration  Tone: Normal tone throughout    Laboratory:  CMP:     Recent Labs  Lab 04/29/18  0242   CALCIUM 9.1   ALBUMIN 3.1*   PROT 6.1      K 4.4   CO2 23      BUN 9   CREATININE 0.8   ALKPHOS 55   ALT 9*   AST 18   BILITOT 0.7     CBC:     Recent Labs  Lab 04/29/18  0242   WBC 8.05   RBC 4.41   HGB 12.5   HCT 39.1      MCV 89   MCH 28.3   MCHC 32.0     Coagulation:     Recent Labs  Lab 04/27/18  2328 04/29/18  0242   INR 1.2 1.2   APTT <21.0  --      TSH:     Recent Labs  Lab 04/27/18 2328   TSH 3.750       Diagnostic Results     Brain Imaging   CTH 4/27  No acute intracranial abnormality. Chronic changes     CTA head/neck 4/27  No acute abnormality. No high-grade stenosis or major vessel occlusion.    MRI brain pending     Vessel Imaging   CTA head/neck 4/27  No acute abnormality. No high-grade stenosis or major vessel occlusion.    Cardiac Imaging   2D echo 4/28    1 - Mildly depressed left ventricular systolic function (EF 45-50%).     2 - Concentric remodeling.     3 - Wall motion abnormalities.     4 - Low normal right ventricular systolic function .     5 - Mild to moderate mitral regurgitation.     6 - Trivial tricuspid regurgitation.     7 - The estimated PA systolic pressure is 28 mmHg.         Gaurang Morris MD  Comprehensive Stroke  [place]  Department of Vascular Neurology   [facility]

## 2018-04-29 NOTE — ASSESSMENT & PLAN NOTE
- Home atenolol restarted  - AC held post-TPA, defer choice of anticoagulation medication to Vascular Neurology service

## 2018-04-29 NOTE — PLAN OF CARE
Problem: Patient Care Overview  Goal: Plan of Care Review  Outcome: Ongoing (interventions implemented as appropriate)  POC reviewed with patient and family at 1630. Sobia verbalized understanding. Questions and concerns addressed with patient and family. No acute events today. Patient has transfer orders. CT of abdomen was placed. RN called CT and was informed that they would get back to the RN when ready. RN will continue to monitor. See flowsheets for full assessment and VS info.

## 2018-04-29 NOTE — ASSESSMENT & PLAN NOTE
- Stroke risk factor.  Patient newly diagnosed w/Afib.  Reportedly stopped taking coumadin about 3 weeks ago due to concerns about bleeding  - source of stroke was likely thromboembolic   - rate controlled on atenolol (home med)  - Can resume coumadin. Will need bridging with heparin

## 2018-04-29 NOTE — PLAN OF CARE
Problem: Occupational Therapy Goal  Goal: Occupational Therapy Goal  Outcome: Outcome(s) achieved Date Met: 04/29/18  Evaluation completed and patient without skilled OT needs  Zunilda Cotter, OT  4/29/2018

## 2018-04-29 NOTE — ASSESSMENT & PLAN NOTE
- SBP goal 100-180  - Home benazepril restarted, patient reports only takes clonidine once daily, only takes HCTZ when she feels she needs fluid off

## 2018-04-29 NOTE — PROGRESS NOTES
RN ordered to give the patient oral contrast at 1745 and 1830 per ISREAL Krishnamurthy tech. Scan scheduled for close to 1900. RN will continue to monitor.

## 2018-04-29 NOTE — PROGRESS NOTES
RN notified NCC team that the patient was having chest pain. RN performed 12 lead EKG. NO shortness of breath reported. When patient was back in bed, pain relieved. Patient is back in the chair with no reports of chest pain or shortness of breath. NCC team ordered a GI cocktail. RN will continue to monitor.

## 2018-04-29 NOTE — SUBJECTIVE & OBJECTIVE
Neurologic Chief Complaint: aphasia, right hemiparesis    Subjective:     Interval History: Patient is seen for follow-up neurological assessment and treatment recommendations:     Further improvement in speech. Expressive aphasia has resolved. Right UE/LE strength improved. Can re-start anticoagulation. Stepdown to floor    HPI, Past Medical, Family, and Social History remains the same as documented in the initial encounter.     Review of Systems   Constitutional: Negative for chills and fever.   HENT: Negative for drooling and trouble swallowing.    Eyes: Negative for redness and visual disturbance.   Respiratory: Negative for cough and shortness of breath.    Cardiovascular: Negative for chest pain and palpitations.   Gastrointestinal: Negative for abdominal pain, nausea and vomiting.   Endocrine: Negative for cold intolerance and heat intolerance.   Genitourinary: Negative for dysuria and frequency.   Musculoskeletal: Negative for neck pain and neck stiffness.   Skin: Negative for rash and wound.   Allergic/Immunologic: Negative for environmental allergies and food allergies.   Neurological: Negative for dizziness, facial asymmetry, speech difficulty, weakness, numbness and headaches.   Hematological: Negative for adenopathy. Does not bruise/bleed easily.   Psychiatric/Behavioral: Negative for agitation.     Scheduled Meds:   aspirin  81 mg Oral Daily    atenolol  25 mg Oral Daily    atorvastatin  40 mg Oral Daily    benazepril  10 mg Oral Daily    fentaNYL        heparin (porcine)  5,000 Units Subcutaneous Q8H    sodium chloride 0.9%  3 mL Intravenous Q8H     Continuous Infusions:    PRN Meds:acetaminophen, dextrose 50%, glucagon (human recombinant), hydrALAZINE, insulin aspart U-100, labetalol, magnesium oxide, magnesium oxide, ondansetron, polyethylene glycol, potassium chloride 10%, potassium chloride 10%, potassium chloride 10%, potassium, sodium phosphates, potassium, sodium phosphates, potassium,  sodium phosphates, promethazine (PHENERGAN) IVPB    Objective:     Vital Signs (Most Recent):  Temp: 98 °F (36.7 °C) (04/29/18 1105)  Pulse: 80 (04/29/18 1205)  Resp: (!) 26 (04/29/18 1205)  BP: (!) 181/79 (04/29/18 1205)  SpO2: 97 % (04/29/18 1205)  BP Location: Left arm    Vital Signs Range (Last 24H):  Temp:  [98 °F (36.7 °C)-98.9 °F (37.2 °C)]   Pulse:  []   Resp:  [11-29]   BP: (110-181)/()   SpO2:  [93 %-98 %]   BP Location: Left arm    Physical Exam   Constitutional: She is oriented to person, place, and time. She appears well-developed and well-nourished. No distress.   HENT:   Head: Normocephalic and atraumatic.   Right Ear: External ear normal.   Left Ear: External ear normal.   Nose: Nose normal.   Mouth/Throat: Oropharynx is clear and moist. No oropharyngeal exudate.   Eyes: Conjunctivae and EOM are normal. Pupils are equal, round, and reactive to light. Right eye exhibits no discharge. Left eye exhibits no discharge. No scleral icterus.   Neck: Normal range of motion. Neck supple. No thyromegaly present.   Cardiovascular: Normal rate, regular rhythm and normal heart sounds.    No murmur heard.  Pulmonary/Chest: Effort normal and breath sounds normal. No respiratory distress. She has no wheezes. She has no rhonchi. She has no rales.   Abdominal: Soft. Bowel sounds are normal. She exhibits no distension. There is no hepatosplenomegaly. There is no tenderness.   Musculoskeletal: She exhibits no edema.   Lymphadenopathy:     She has no cervical adenopathy.   Neurological: She is alert and oriented to person, place, and time.   Skin: Skin is warm and dry. Capillary refill takes less than 2 seconds. She is not diaphoretic.   Psychiatric: She has a normal mood and affect.   Nursing note and vitals reviewed.      Neurological Exam:   LOC: alert  Attention Span: Good   Language: No aphasia  Articulation: No dysarthria  Orientation: Person, Place, Time   Visual Fields: Full  EOM (CN III, IV, VI):  Full/intact  Pupils (CN II, III): PERRL  Facial Sensation (CN V): Normal  Facial Movement (CN VII): Symmetric facial expression    Motor: Arm left  Normal 5/5  Leg left  Normal 5/5  Arm right  Normal 5/5  Leg right Paresis: 4/5  Cebellar: No evidence of appendicular or axial ataxia  Sensation: Intact to light touch, temperature and vibration  Tone: Normal tone throughout    Laboratory:  CMP:     Recent Labs  Lab 04/29/18  0242   CALCIUM 9.1   ALBUMIN 3.1*   PROT 6.1      K 4.4   CO2 23      BUN 9   CREATININE 0.8   ALKPHOS 55   ALT 9*   AST 18   BILITOT 0.7     CBC:     Recent Labs  Lab 04/29/18  0242   WBC 8.05   RBC 4.41   HGB 12.5   HCT 39.1      MCV 89   MCH 28.3   MCHC 32.0     Coagulation:     Recent Labs  Lab 04/27/18 2328 04/29/18  0242   INR 1.2 1.2   APTT <21.0  --      TSH:     Recent Labs  Lab 04/27/18 2328   TSH 3.750       Diagnostic Results     Brain Imaging   CTH 4/27  No acute intracranial abnormality. Chronic changes     CTA head/neck 4/27  No acute abnormality. No high-grade stenosis or major vessel occlusion.    MRI brain pending     Vessel Imaging   CTA head/neck 4/27  No acute abnormality. No high-grade stenosis or major vessel occlusion.    Cardiac Imaging   2D echo 4/28    1 - Mildly depressed left ventricular systolic function (EF 45-50%).     2 - Concentric remodeling.     3 - Wall motion abnormalities.     4 - Low normal right ventricular systolic function .     5 - Mild to moderate mitral regurgitation.     6 - Trivial tricuspid regurgitation.     7 - The estimated PA systolic pressure is 28 mmHg.

## 2018-04-29 NOTE — HOSPITAL COURSE
4/27: Admit to [facility]   4/28: MRI shows small L temporoparietal infarct  4/29: TTF under Vascular Neurology

## 2018-04-29 NOTE — CARE UPDATE
Spoke with [facility] earlier today that patient was deemed safe for transfer to floor. On afternoon rounds, patient stated that she was nauseous and had 1 episode of emesis. On exam, she was tender in LUQ and LLQ. Of note, patient was diagnosed with diverticulitis last week and was treated for 7 days with augmentin.    Would recommend CT abd/pelvis with contrast.    If evidence of diverticulitis, would start empiric coverage with IV cefepime and flagyl (given that it is a nosocomial infection). Patient has an allergy to ciprofloxacin.    Gaurang Morris DO  Vascular Neurology  Pager: [phone]

## 2018-04-29 NOTE — ASSESSMENT & PLAN NOTE
Diagnoses on 4/20 and treated with 7d course augmentin + flagyl which completed 4/27. Recurrence of TTP in lower abdomen, nausea and vomiting.  - Spoke with GS, recommend getting repeat CT abdomen to assess for diverticulitis, may need IV ABX if failed OP treatment  - Antiemetics, pain control   - Remains afebrile, without leukocytosis or hemodynamic instability

## 2018-04-29 NOTE — PT/OT/SLP EVAL
Occupational Therapy   Evaluation and Discharge Note    Name: Lyn Rapp  MRN: [id number]  Admitting Diagnosis:  Embolic stroke involving left middle cerebral artery      Recommendations:     Discharge Recommendations: home  Discharge Equipment Recommendations:  none  Barriers to discharge:  None    History:     Occupational Profile:  Living Environment: Pt lives with spouse in single story home with no steps   Previous level of function: Pt independent with self care, community access and was employed full time   Equipment Owned:  none  Assistance upon Discharge: Pt souse available to assist as needed     Past Medical History:   Diagnosis Date    Acid reflux     Coronary artery disease due to calcified coronary lesion 7/13/2016    nonobstructive    Hypertension     Melanoma 1994    Melanoma of back     Mitral valve prolapse        Past Surgical History:   Procedure Laterality Date    APPENDECTOMY      APPENDECTOMY      BREAST BIOPSY Right     benign excisional biopsy    CARDIAC CATHETERIZATION      CHOLECYSTECTOMY      COLONOSCOPY      HYSTERECTOMY  1982    MANDIBLE SURGERY      OOPHORECTOMY Bilateral 1982    TONSILLECTOMY      TUBAL LIGATION         Subjective     Chief Complaint: fatigue, concern for health status   Patient/Family stated goals: return to home and work   Communicated with: RN prior to session.  Pain/Comfort:  · Pain Rating 1: 0/10    Patients cultural, spiritual, [employer] conflicts given the current situation: none stated     Objective:     Patient found with:  (all lines intact )    General Precautions: Standard, fall   Orthopedic Precautions:N/A   Braces: N/A     Occupational Performance:    Bed Mobility:    · Independent bed mobility     Functional Mobility/Transfers:  Functional Mobility: Pt with supervision for in patient mobility but without physical assist      Activities of Daily Living:  · Pt able to complete self care tasks in room    Cognitive/Visual  "Perceptual:  Cognitive/Psychosocial Skills:     -       Oriented to: Person, Place, Time and Situation   -       Follows Commands/attention:Follows two-step commands  -       Communication: clear/fluent  -       Memory: No Deficits noted  -       Safety awareness/insight to disability: intact   -       Mood/Affect/Coping skills/emotional control: Appropriate to situation    Physical Exam:  Upper Extremity Range of Motion:     -       Right Upper Extremity: WFL  -       Left Upper Extremity: WFL  Upper Extremity Strength: -       Right Upper Extremity: WFL  -       Left Upper Extremity: WFL   Strength:    -       Right Upper Extremity: WFL  -       Left Upper Extremity: WFL    Patient left up in chair with all lines intact    AMPA 6 Click:  [facility] Total Score: 24    Treatment & Education:  Evaluation complete.  Pt educated on safety, role of OT, importance of increased participation in self care for gains , expectations for participation, expectations for gains, POC, energy conservation, caregiver strain. White board updated.     Education:    Assessment:     Lyn Rapp is a 73 y.o. female with a medical diagnosis of Embolic stroke involving left middle cerebral artery. At this time, patient is functioning at their prior level of function and does not require further acute OT services.     Clinical Decision Makin.  OT Low:  "Pt evaluation falls under low complexity for evaluation coding due to performance deficits noted in 1-3 areas as stated above and 0 co-morbities affecting current functional status. Data obtained from problem focused assessments. No modifications or assistance was required for completion of evaluation. Only brief occupational profile and history review completed."     Plan:     During this hospitalization, patient does not require further acute OT services.  Please re-consult if situation changes.    · Plan of Care Reviewed with: patient, spouse, daughter    This Plan of care " has been discussed with the patient who was involved in its development and understands and is in agreement with the identified goals and treatment plan    GOALS:    Occupational Therapy Goals     Not on file          Multidisciplinary Problems (Resolved)        Problem: Occupational Therapy Goal    Goal Priority Disciplines Outcome Interventions   Occupational Therapy Goal   (Resolved)     OT, PT/OT Outcome(s) achieved                    Time Tracking:     OT Date of Treatment: 04/29/18  OT Start Time: 1005  OT Stop Time: 1020  OT Total Time (min): 15 min    Billable Minutes:Evaluation 15    Zunilda Cotter OT  4/29/2018

## 2018-04-29 NOTE — ASSESSMENT & PLAN NOTE
73 y.o. female with significant past medical history of GERD, HTN, CAD, Afib, and diverticulitis presented to hospital as a transfer from [facility] ED for evaluation of L MCA stroke symptoms.  Patient received tPA.  CTA MP negative for LVO. Source is likely thromboembolic as patient has A Fib and stopped taking coumadin about 3 weeks ago.    MRI brain shows small subacute left temporal parietal lobe infarct.  No mass effect or intracranial hemorrhage. Expressive aphasia resolved. Right UE and LE strength improved. Can be stepped down to floor    Antithrombotics for secondary stroke prevention: Antiplatelets: Aspirin: 81 mg daily  Anticoagulants: Warfarin INR adjusted target    Statins for secondary stroke prevention and hyperlipidemia, if present:   Statins: Atorvastatin- 40 mg daily    Aggressive risk factor modification: HTN, A-Fib     Rehab efforts: PT/OT/SLP to evaluate and treat    Diagnostics ordered/pending: none    VTE prophylaxis: Heparin 5000 units SQ every 8 hours    BP parameters: Infarct: Post tPA, SBP <180

## 2018-04-29 NOTE — PLAN OF CARE
ICU Attending Note  Neurocritical Care    E4V5M6    -aspirin, atorvastatin currently but likely start NOAC v warfarin on discharge  --180  -atenolol, restart other antihypertensives as appropriate  -coordinate transfer to floor v discharge to home with Stroke

## 2018-04-29 NOTE — PROGRESS NOTES
[facility]  Neurocritical Care  Progress Note    Admit Date: 4/27/2018  Service Date: 04/29/2018  Length of Stay: 2    Subjective:     Chief Complaint: Embolic stroke involving left middle cerebral artery    History of Present Illness: No notes on file    Hospital Course: 4/27: Admit to [facility]   4/28: MRI shows small L temporoparietal infarct  4/29: TTF under Vascular Neurology    Interval History: Patient neurologically stable this AM. Had episode of emesis, HA and tachycardia this afternoon. Doing better after dose of Zofran. Patient completed 7 day course of augmentin + flagyl  for diverticulitis two days ago. Will repeat CT abdomen to assess for diverticulitis. Hold off on discharge home and will transfer to floor under Vascular Neurology service.     Review of Systems: Review of Systems   Constitutional: Negative for chills and fever.   Eyes: Negative for blurred vision.   Respiratory: Negative for cough.    Cardiovascular: Negative for chest pain and palpitations.   Gastrointestinal: Positive for abdominal pain, nausea and vomiting.       Vitals:   Temp: 98.5 °F (36.9 °C)  Pulse: (!) 113  Rhythm: normal sinus rhythm  BP: 135/85  MAP (mmHg): 104  Resp: 20  SpO2: 96 %  O2 Device (Oxygen Therapy): room air    Temp  Min: 98 °F (36.7 °C)  Max: 98.8 °F (37.1 °C)  Pulse  Min: 62  Max: 127  BP  Min: 110/72  Max: 181/79  MAP (mmHg)  Min: 85  Max: 137  Resp  Min: 11  Max: 29  SpO2  Min: 93 %  Max: 98 %    04/28 0701 - 04/29 0700  In: 2055 [I.V.:1956]  Out: 400 [Urine:400]   Unmeasured Output  Urine Occurrence: 0  Stool Occurrence: 0  Emesis Occurrence: 1  Pad Count: 0     Examination:   Constitutional: Well-nourished and -developed. Some painful distress, abdominal pain.   Eyes: Conjunctiva clear, anicteric. Lids no lesions.  Head/Ears/Nose/Mouth/Throat/Neck: Moist mucous membranes. External ears, nose atraumatic.   Cardiovascular: Irregularly irregular tachycardia. No murmurs. No leg  edema.  Respiratory: Comfortable respirations. Clear to auscultation.  Gastrointestinal: No hernia. Soft, nondistended, +TTP throughout lower abdomen. Hyperactive bowel sounds.    Neurologic:  -GCS E4V5M6  -Alert. Oriented to person, place, and time. Speech fluent. Follows commands.  -Cranial nerves intact  -LLE focal weakness, 4/5, all other extremities 5/5  -Sensation intact    Medications:   Continuous Scheduled  aspirin 81 mg Daily   atenolol 25 mg Daily   atorvastatin 40 mg Daily   benazepril 10 mg Daily   heparin (porcine) 5,000 Units Q8H   sodium chloride 0.9% 3 mL Q8H   PRN  acetaminophen 650 mg Q6H PRN   dextrose 50% 12.5 g PRN   glucagon (human recombinant) 1 mg PRN   hydrALAZINE 10 mg Q4H PRN   insulin aspart U-100 0-5 Units Q6H PRN   labetalol 10 mg Q30 Min PRN   magnesium oxide 800 mg PRN   magnesium oxide 800 mg PRN   ondansetron 4 mg Q8H PRN   polyethylene glycol 17 g Daily PRN   potassium chloride 10% 40 mEq PRN   potassium chloride 10% 40 mEq PRN   potassium chloride 10% 60 mEq PRN   potassium, sodium phosphates 2 packet PRN   potassium, sodium phosphates 2 packet PRN   potassium, sodium phosphates 2 packet PRN   promethazine (PHENERGAN) IVPB 6.25 mg Q6H PRN      Today I independently reviewed pertinent medications, lines/drains/airways, lab results,     Assessment/Plan:     Neuro   * Embolic stroke involving left middle cerebral artery    74 y/o female with AF off AC who presented to [facility] [place]/p [place]PA for acute onset aphasia on 4/27. Confirmed small L temporoparietal infarct on MRI.   - PT/OT and SLP recommending discharge home with no further therapy needs   - SBP goal 100-180, home meds restarted as taken by patient  - ASA, statin  - Will need AC for AF prior to discharge, defer choice of medication to Vascular Neurology service  - Neurologically stable  - TTF under Vascular Neurology service.         Cardiac/Vascular   AF (paroxysmal atrial fibrillation)    - Home atenolol restarted  - AC held  post-TPA, defer choice of anticoagulation medication to Vascular Neurology service        Benign essential HTN    - SBP goal 100-180  - Home benazepril restarted, patient reports only takes clonidine once daily, only takes HCTZ when she feels she needs fluid off        GI   Diverticulitis    Diagnoses on 4/20 and treated with 7d course augmentin + flagyl which completed 4/27. Recurrence of TTP in lower abdomen, nausea and vomiting.  - Spoke with GS, recommend getting repeat CT abdomen to assess for diverticulitis, may need IV ABX if failed OP treatment  - Antiemetics, pain control   - Remains afebrile, without leukocytosis or hemodynamic instability            Prophylaxis:  Venous Thromboembolism: mechanical chemical  Stress Ulcer: NA  Ventilator Pneumonia: not applicable     Activity Orders          None        Full Code    Ashley Acosta PA-C  Neurocritical Care  [facility]

## 2018-04-29 NOTE — ASSESSMENT & PLAN NOTE
72 y/o female with AF off AC who presented to [facility] s/p TPA for acute onset aphasia on 4/27. Confirmed small L temporoparietal infarct on MRI.   - PT/OT and SLP recommending discharge home with no further therapy needs   - SBP goal 100-180, home meds restarted as taken by patient  - ASA, statin  - Will need AC for AF prior to discharge, defer choice of medication to Vascular Neurology service  - Neurologically stable  - TTF under Vascular Neurology service.

## 2018-04-30 LAB
ALBUMIN SERPL BCP-MCNC: 3.2 G/DL
ALP SERPL-CCNC: 51 U/L
ALT SERPL W/O P-5'-P-CCNC: 14 U/L
ANION GAP SERPL CALC-SCNC: 10 MMOL/L
AST SERPL-CCNC: 30 U/L
BASOPHILS # BLD AUTO: 0.04 K/UL
BASOPHILS NFR BLD: 0.4 %
BILIRUB SERPL-MCNC: 0.6 MG/DL
BUN SERPL-MCNC: 8 MG/DL
CALCIUM SERPL-MCNC: 8.7 MG/DL
CHLORIDE SERPL-SCNC: 102 MMOL/L
CO2 SERPL-SCNC: 22 MMOL/L
CREAT SERPL-MCNC: 0.9 MG/DL
CRP SERPL-MCNC: 12.3 MG/L
DIFFERENTIAL METHOD: ABNORMAL
EOSINOPHIL # BLD AUTO: 0 K/UL
EOSINOPHIL NFR BLD: 0.2 %
ERYTHROCYTE [DISTWIDTH] IN BLOOD BY AUTOMATED COUNT: 14.5 %
EST. GFR  (AFRICAN AMERICAN): >60 ML/MIN/1.73 M^2
EST. GFR  (NON AFRICAN AMERICAN): >60 ML/MIN/1.73 M^2
GLUCOSE SERPL-MCNC: 224 MG/DL
HCT VFR BLD AUTO: 36.4 %
HGB BLD-MCNC: 11.9 G/DL
IMM GRANULOCYTES # BLD AUTO: 0.06 K/UL
IMM GRANULOCYTES NFR BLD AUTO: 0.6 %
INR PPP: 1.1
LYMPHOCYTES # BLD AUTO: 1.1 K/UL
LYMPHOCYTES NFR BLD: 11.6 %
MAGNESIUM SERPL-MCNC: 1.7 MG/DL
MCH RBC QN AUTO: 28.6 PG
MCHC RBC AUTO-ENTMCNC: 32.7 G/DL
MCV RBC AUTO: 88 FL
MONOCYTES # BLD AUTO: 0.5 K/UL
MONOCYTES NFR BLD: 4.9 %
NEUTROPHILS # BLD AUTO: 7.7 K/UL
NEUTROPHILS NFR BLD: 82.3 %
NRBC BLD-RTO: 0 /100 WBC
PHOSPHATE SERPL-MCNC: 1.9 MG/DL
PLATELET # BLD AUTO: 310 K/UL
PMV BLD AUTO: 10.2 FL
POTASSIUM SERPL-SCNC: 3.5 MMOL/L
PREALB SERPL-MCNC: 14 MG/DL
PROT SERPL-MCNC: 6.3 G/DL
PROTHROMBIN TIME: 11 SEC
RBC # BLD AUTO: 4.16 M/UL
SODIUM SERPL-SCNC: 134 MMOL/L
WBC # BLD AUTO: 9.35 K/UL

## 2018-04-30 PROCEDURE — 99222 1ST HOSP IP/OBS MODERATE 55: CPT | Mod: ,,, | Performed by: NURSE PRACTITIONER

## 2018-04-30 PROCEDURE — S0030 INJECTION, METRONIDAZOLE: HCPCS | Performed by: NURSE PRACTITIONER

## 2018-04-30 PROCEDURE — 25000003 PHARM REV CODE 250: Performed by: PHYSICIAN ASSISTANT

## 2018-04-30 PROCEDURE — 36415 COLL VENOUS BLD VENIPUNCTURE: CPT

## 2018-04-30 PROCEDURE — 85610 PROTHROMBIN TIME: CPT

## 2018-04-30 PROCEDURE — S5010 5% DEXTROSE AND 0.45% SALINE: HCPCS | Performed by: NURSE PRACTITIONER

## 2018-04-30 PROCEDURE — 99233 SBSQ HOSP IP/OBS HIGH 50: CPT | Mod: GC,,, | Performed by: PSYCHIATRY & NEUROLOGY

## 2018-04-30 PROCEDURE — 25000003 PHARM REV CODE 250: Performed by: STUDENT IN AN ORGANIZED HEALTH CARE EDUCATION/TRAINING PROGRAM

## 2018-04-30 PROCEDURE — A4216 STERILE WATER/SALINE, 10 ML: HCPCS | Performed by: STUDENT IN AN ORGANIZED HEALTH CARE EDUCATION/TRAINING PROGRAM

## 2018-04-30 PROCEDURE — 63600175 PHARM REV CODE 636 W HCPCS: Performed by: PHYSICIAN ASSISTANT

## 2018-04-30 PROCEDURE — 63600175 PHARM REV CODE 636 W HCPCS: Performed by: NURSE PRACTITIONER

## 2018-04-30 PROCEDURE — 25000003 PHARM REV CODE 250: Performed by: NURSE PRACTITIONER

## 2018-04-30 PROCEDURE — 85025 COMPLETE CBC W/AUTO DIFF WBC: CPT

## 2018-04-30 PROCEDURE — 83735 ASSAY OF MAGNESIUM: CPT

## 2018-04-30 PROCEDURE — 84100 ASSAY OF PHOSPHORUS: CPT

## 2018-04-30 PROCEDURE — 80053 COMPREHEN METABOLIC PANEL: CPT

## 2018-04-30 PROCEDURE — 84134 ASSAY OF PREALBUMIN: CPT

## 2018-04-30 PROCEDURE — 92507 TX SP LANG VOICE COMM INDIV: CPT

## 2018-04-30 PROCEDURE — 86140 C-REACTIVE PROTEIN: CPT

## 2018-04-30 PROCEDURE — 20600001 HC STEP DOWN PRIVATE ROOM

## 2018-04-30 RX ORDER — ATENOLOL 50 MG/1
50 TABLET ORAL DAILY
Status: DISCONTINUED | OUTPATIENT
Start: 2018-04-30 | End: 2018-05-02 | Stop reason: HOSPADM

## 2018-04-30 RX ORDER — ONDANSETRON 8 MG/1
8 TABLET, ORALLY DISINTEGRATING ORAL ONCE
Status: COMPLETED | OUTPATIENT
Start: 2018-04-30 | End: 2018-04-30

## 2018-04-30 RX ORDER — METRONIDAZOLE 500 MG/100ML
500 INJECTION, SOLUTION INTRAVENOUS
Status: DISCONTINUED | OUTPATIENT
Start: 2018-04-30 | End: 2018-05-02

## 2018-04-30 RX ORDER — MORPHINE SULFATE 2 MG/ML
2 INJECTION, SOLUTION INTRAMUSCULAR; INTRAVENOUS EVERY 6 HOURS PRN
Status: DISCONTINUED | OUTPATIENT
Start: 2018-04-30 | End: 2018-05-02 | Stop reason: HOSPADM

## 2018-04-30 RX ORDER — ACETAMINOPHEN 650 MG/1
650 SUPPOSITORY RECTAL EVERY 8 HOURS PRN
Status: DISCONTINUED | OUTPATIENT
Start: 2018-04-30 | End: 2018-05-02 | Stop reason: HOSPADM

## 2018-04-30 RX ORDER — ONDANSETRON 4 MG/1
4 TABLET, ORALLY DISINTEGRATING ORAL ONCE
Status: DISCONTINUED | OUTPATIENT
Start: 2018-04-30 | End: 2018-04-30

## 2018-04-30 RX ORDER — PROMETHAZINE HYDROCHLORIDE 25 MG/1
25 SUPPOSITORY RECTAL EVERY 6 HOURS PRN
Status: DISCONTINUED | OUTPATIENT
Start: 2018-04-30 | End: 2018-04-30

## 2018-04-30 RX ORDER — CEFEPIME HYDROCHLORIDE 2 G/1
2 INJECTION, POWDER, FOR SOLUTION INTRAVENOUS
Status: DISCONTINUED | OUTPATIENT
Start: 2018-04-30 | End: 2018-05-02

## 2018-04-30 RX ORDER — PROMETHAZINE HYDROCHLORIDE 12.5 MG/1
12.5 SUPPOSITORY RECTAL EVERY 6 HOURS PRN
Status: DISCONTINUED | OUTPATIENT
Start: 2018-04-30 | End: 2018-04-30

## 2018-04-30 RX ORDER — PROMETHAZINE HYDROCHLORIDE 25 MG/1
25 SUPPOSITORY RECTAL EVERY 6 HOURS PRN
Status: DISCONTINUED | OUTPATIENT
Start: 2018-04-30 | End: 2018-05-02 | Stop reason: HOSPADM

## 2018-04-30 RX ORDER — DEXTROSE MONOHYDRATE AND SODIUM CHLORIDE 5; .45 G/100ML; G/100ML
1000 INJECTION, SOLUTION INTRAVENOUS CONTINUOUS
Status: DISCONTINUED | OUTPATIENT
Start: 2018-04-30 | End: 2018-05-01

## 2018-04-30 RX ORDER — ONDANSETRON 8 MG/1
8 TABLET, ORALLY DISINTEGRATING ORAL EVERY 6 HOURS PRN
Status: DISCONTINUED | OUTPATIENT
Start: 2018-04-30 | End: 2018-05-02 | Stop reason: HOSPADM

## 2018-04-30 RX ORDER — ACETAMINOPHEN 10 MG/ML
1000 INJECTION, SOLUTION INTRAVENOUS ONCE
Status: DISCONTINUED | OUTPATIENT
Start: 2018-04-30 | End: 2018-04-30

## 2018-04-30 RX ADMIN — HEPARIN SODIUM 5000 UNITS: 5000 INJECTION, SOLUTION INTRAVENOUS; SUBCUTANEOUS at 02:04

## 2018-04-30 RX ADMIN — MORPHINE SULFATE 2 MG: 2 INJECTION, SOLUTION INTRAMUSCULAR; INTRAVENOUS at 10:04

## 2018-04-30 RX ADMIN — DEXTROSE AND SODIUM CHLORIDE 250 ML: 5; .45 INJECTION, SOLUTION INTRAVENOUS at 06:04

## 2018-04-30 RX ADMIN — PROMETHAZINE HYDROCHLORIDE 12.5 MG: 12.5 SUPPOSITORY RECTAL at 12:04

## 2018-04-30 RX ADMIN — SODIUM PHOSPHATE, MONOBASIC, MONOHYDRATE 20.01 MMOL: 276; 142 INJECTION, SOLUTION INTRAVENOUS at 11:04

## 2018-04-30 RX ADMIN — METRONIDAZOLE 500 MG: 500 INJECTION, SOLUTION INTRAVENOUS at 09:04

## 2018-04-30 RX ADMIN — DEXTROSE AND SODIUM CHLORIDE 1000 ML: 5; .45 INJECTION, SOLUTION INTRAVENOUS at 06:04

## 2018-04-30 RX ADMIN — BENAZEPRIL HYDROCHLORIDE 10 MG: 10 TABLET, FILM COATED ORAL at 09:04

## 2018-04-30 RX ADMIN — METRONIDAZOLE 500 MG: 500 INJECTION, SOLUTION INTRAVENOUS at 05:04

## 2018-04-30 RX ADMIN — MORPHINE SULFATE 2 MG: 2 INJECTION, SOLUTION INTRAMUSCULAR; INTRAVENOUS at 04:04

## 2018-04-30 RX ADMIN — Medication 3 ML: at 10:04

## 2018-04-30 RX ADMIN — ONDANSETRON 8 MG: 8 TABLET, ORALLY DISINTEGRATING ORAL at 05:04

## 2018-04-30 RX ADMIN — ASPIRIN 81 MG: 81 TABLET, COATED ORAL at 09:04

## 2018-04-30 RX ADMIN — HEPARIN SODIUM 5000 UNITS: 5000 INJECTION, SOLUTION INTRAVENOUS; SUBCUTANEOUS at 05:04

## 2018-04-30 RX ADMIN — Medication 3 ML: at 06:04

## 2018-04-30 RX ADMIN — CEFEPIME HYDROCHLORIDE 2 G: 2 INJECTION, POWDER, FOR SOLUTION INTRAVENOUS at 05:04

## 2018-04-30 RX ADMIN — METRONIDAZOLE 500 MG: 500 INJECTION, SOLUTION INTRAVENOUS at 01:04

## 2018-04-30 RX ADMIN — HEPARIN SODIUM 5000 UNITS: 5000 INJECTION, SOLUTION INTRAVENOUS; SUBCUTANEOUS at 09:04

## 2018-04-30 RX ADMIN — Medication 3 ML: at 12:04

## 2018-04-30 RX ADMIN — ATENOLOL 50 MG: 50 TABLET ORAL at 09:04

## 2018-04-30 NOTE — PLAN OF CARE
Problem: SLP Goal  Goal: SLP Goal  Speech Language Pathology Goals  Goals expected to be met by 5/5  1. Pt will participate in further assessment of visual-spatial skills.   2. Pt will complete moderate level word finding tasks with 80% accuracy, to improve expressive language skills.   3.  Pt will list 15 items in one minute on a word fluency task, to improve word fluency/thought organization skills.   4.  Pt will complete categorization tasks with 90% accuracy, to improve expressive language skills.   5.  Further assess higher level cognitive skills including math/time/basic money/deduction to determine if further intervention is warranted.  6. Pt will tolerate regular diet with thin liquids and no overt s/s of aspiration.       Outcome: Ongoing (interventions implemented as appropriate)  When medically appropriate, recommend regular consistency diet and thin liquids. Per pt's  and daughter, pt's cognitive-linguistic function at baseline. SLP tx freq dec'd to 2x/ week to monitor diet tolerance when pt medically appropriate.     SAMANTHA Smith, CCC-SLP  [phone]  4/30/2018

## 2018-04-30 NOTE — ASSESSMENT & PLAN NOTE
73 year old female with new onset of aphasia which has improved, now with abd pain, Ct pos for diverticulitis with small perforation    -NPO until abd pain improved  -continue current antibiotics  -pain control  -continue IVF  -nutrition labs  -serial abd exams  -may need IR drain

## 2018-04-30 NOTE — PT/OT/SLP PROGRESS
"Speech Language Pathology Treatment    Patient Name:  Lyn Rapp   MRN:  [id number]   725/725 A    Admitting Diagnosis: Embolic stroke involving left middle cerebral artery    Recommendations:                 General Recommendations:  Dysphagia therapy and Speech/language therapy  Diet recommendations: When medically stable: Regular, Liquid Diet Level: Thin   Aspiration Precautions: Standard aspiration precautions   General Precautions: Standard, fall  Communication strategies:  go to room if call light pushed    Subjective     "I can't have a bag (ostomy)." Pt observed to report in conversation with NP.      Pain/Comfort:  · Pain Rating 1: other (see comments) (Pt did not indicate severity of pain)  · Location 1: abdomen  · Pain Addressed 1: Nurse notified  · Pain Rating Post-Intervention 1: other (see comments) (Pt did not indicate severity of pain)    Objective:     Has the patient been evaluated by SLP for swallowing?   Yes  Keep patient NPO? No   Current Respiratory Status: room air      Pt awake and alert upon entry with  and daughter at bedside. Pt demonstrating significant abdominal pain, as evidenced by near consistent facial grimacing with hands resting on abdomen. Upon clinician arrival, NP at bedside informing pt and her family of POC re: recent medical diagnosis of diverticulitis. Verbal order received from NP for pt to receive clear liquids only until further notice. NSG arrived to pt's bedside to administer IV pain medications. Given verbal order from NP for clear liquids only and pt's demonstration of pain, PO trials and cognitive-linguistic therapy deferred this date. Extensive education provided to pt's  and daughter re: role of SLP, definition/ risks/ overt clinical signs of aspiration, results of SLP evaluation completed 4/28/18, and SLP POC. Additional education provided re: consistent implementation of aspiration precautions listed above. Per pt's  and daughter, pt's " cognitive-linguistic function at baseline compared to word finding difficulty noted during initial SLP evaluation. Pt's  and daughter verbalized understanding of all education provided and agreement with SLP POC. No further questions.     Assessment:     Lyn Rapp is a 73 y.o. female with an SLP diagnosis of risk for aspiration and cognitive-linguistic impairment s/p CVA.     Goals:    SLP Goals        Problem: SLP Goal    Goal Priority Disciplines Outcome   SLP Goal     SLP Ongoing (interventions implemented as appropriate)   Description:  Speech Language Pathology Goals  Goals expected to be met by 5/5  1. Pt will participate in further assessment of visual-spatial skills.   2. Pt will complete moderate level word finding tasks with 80% accuracy, to improve expressive language skills.   3.  Pt will list 15 items in one minute on a word fluency task, to improve word fluency/thought organization skills.   4.  Pt will complete categorization tasks with 90% accuracy, to improve expressive language skills.   5.  Further assess higher level cognitive skills including math/time/basic money/deduction to determine if further intervention is warranted.  6. Pt will tolerate regular diet with thin liquids and no overt s/s of aspiration.                        Plan:     · Patient to be seen:  2 x/week   · Plan of Care expires:  05/27/18  · Plan of Care reviewed with:  spouse, daughter   · SLP Follow-Up:  Yes       Discharge recommendations:  home     Time Tracking:     SLP Treatment Date:   04/30/18  Speech Start Time:  1036  Speech Stop Time:  1045     Speech Total Time (min):  9 min    Billable Minutes: Speech Therapy Individual 9    SAMANTHA Smith, CCC-SLP  [phone]  4/30/2018

## 2018-04-30 NOTE — CONSULTS
[facility]  Colorectal Surgery  Consult Note    Patient Name: Lyn Rapp  MRN: [id number]  Admission Date: 2018  Hospital Length of Stay: 3 days  Attending Physician: Bello Alex MD  Primary Care Provider: Slime Thomas MD    Inpatient consult to Colorectal Surgery  Consult performed by: FELIX MCKEE  Consult ordered by: COLELEN GARCIA        Subjective:     Chief Complaint/Reason for Admission: diverticulitis    History of Present Illness:  Pt. si a 72 yo F with pmh of CAD, HTN, and a-fib who presents from OSH s/p TPA for acute onset aphasia. Pt. Was at home around 19:30 when she noted symptom onset.  was home at the time and she was able to communicate with him, however difficulty with word finding. She denies any weakness or change to sensation. She was able to ambulate without assistance. EMS was called and patient was transported to OSH shortly after symptom onset. After arrival to OSH, telestroke was consulted and tPa was administered at 21:01.      Pt. Was diagnosed with A-fib by her cardiologist ~ one and a half months ago. She was started on coumadin, however states that she had not received any additional blood work (presumably coumadin clinic) and thus stopped taking her coumadin. Pt. Takes ASA 81mg QHS.     Since admit aphasia has greatly improved, being seen by speech therapy    According to pt she does have hx of diverticulosis, developed abd pain about 2 weeks ago, saw her PCP who gave her antibiotics and after a few days she felt better, completed the course on Friday morning and then Friday nite develop acute onset of aphasia,  Had colon[place] done 3 [place] ago [place] and was told she had divertiulosis.  Has never had an attack of diverticulitis prior to 2 weeks ago.        PTA Medications   Medication Sig    [] amoxicillin-clavulanate 875-125mg (AUGMENTIN) 875-125 mg per tablet Take 1 tablet by mouth 3 (three) times daily.     aspirin (ECOTRIN) 81 MG EC tablet Take 81 mg by mouth once daily.    atenolol (TENORMIN) 25 MG tablet Take 1 tablet (25 mg total) by mouth once daily.    benazepril (LOTENSIN) 10 MG tablet Take 1 tablet (10 mg total) by mouth once daily. (Patient taking differently: Take 20 mg by mouth once daily. )    CALCIUM CARBONATE (CALCIUM 500 ORAL) Take 1,200 mg by mouth once daily.    cholecalciferol, vitamin D3, (VITAMIN D3) 2,000 unit Cap Take 1 capsule by mouth once daily.    cloNIDine (CATAPRES) 0.1 MG tablet Take 1 tablet (0.1 mg total) by mouth 3 (three) times daily.    escitalopram oxalate (LEXAPRO) 20 MG tablet TAKE ONE TABLET BY MOUTH ONCE DAILY    fluticasone (FLONASE) 50 mcg/actuation nasal spray 1 spray by Nasal route as needed.     FLUZONE HIGH-DOSE , PF, 180 mcg/0.5 mL vaccine ADM 0.5ML IM UTD    furosemide (LASIX) 20 MG tablet TAKE 1 TABLET(20 MG) BY MOUTH EVERY DAY (Patient taking differently: TAKE 1 TABLET(20 MG) BY MOUTH EVERY DAY prn)    hydroCHLOROthiazide (HYDRODIURIL) 25 MG tablet Take 1 tablet (25 mg total) by mouth once daily.    hydrocodone-acetaminophen 5-325mg (NORCO) 5-325 mg per tablet Take 1 tablet by mouth every 4 (four) hours as needed for Pain.    [] metroNIDAZOLE (FLAGYL) 500 MG tablet Take 1 tablet (500 mg total) by mouth 3 (three) times daily.    ondansetron (ZOFRAN-ODT) 4 MG TbDL Take 1 tablet (4 mg total) by mouth every 8 (eight) hours as needed.    triamcinolone acetonide 0.1% (KENALOG) 0.1 % cream Apply topically 2 (two) times daily.    warfarin (COUMADIN) 5 MG tablet Take 1 tablet (5 mg total) by mouth Daily.       Review of patient's allergies indicates:   Allergen Reactions    Ciprofloxacin Hives and Swelling     Swelling in throat    Bactrim [sulfamethoxazole-trimethoprim] Rash    Sulfur Hives    Toprol xl [metoprolol succinate] Other (See Comments)     Profuse sweating       Past Medical History:   Diagnosis Date    Acid reflux     Coronary  artery disease due to calcified coronary lesion 7/13/2016    nonobstructive    Hypertension     Melanoma 1994    Melanoma of back     Mitral valve prolapse      Past Surgical History:   Procedure Laterality Date    APPENDECTOMY      APPENDECTOMY      BREAST BIOPSY Right     benign excisional biopsy    CARDIAC CATHETERIZATION      CHOLECYSTECTOMY      COLONOSCOPY      HYSTERECTOMY  1982    MANDIBLE SURGERY      OOPHORECTOMY Bilateral 1982    TONSILLECTOMY      TUBAL LIGATION       Family History     Problem Relation (Age of Onset)    Hypertension Mother    No Known Problems Father        Social History Main Topics    Smoking status: Never Smoker    Smokeless tobacco: Never Used    Alcohol use No    Drug use: No    Sexual activity: Yes     Partners: Male     Review of Systems   Constitutional: Negative for appetite change, chills, fatigue, fever and unexpected weight change.   Respiratory: Negative for cough, chest tightness, shortness of breath and wheezing.    Cardiovascular: Negative for chest pain and leg swelling.   Gastrointestinal: Positive for abdominal pain. Negative for anal bleeding, blood in stool, constipation, diarrhea, nausea, rectal pain and vomiting.   Genitourinary: Negative for difficulty urinating, dysuria, flank pain, frequency and hematuria.   Musculoskeletal: Negative for back pain, gait problem and joint swelling.   Neurological: Negative for syncope, weakness and numbness.   Hematological: Does not bruise/bleed easily.   Psychiatric/Behavioral: Negative for agitation, confusion, decreased concentration and hallucinations. The patient is not nervous/anxious and is not hyperactive.      Objective:     Vital Signs (Most Recent):  Temp: 100.3 °F (37.9 °C) (04/30/18 0515)  Pulse: (!) 129 (04/30/18 1102)  Resp: 18 (04/30/18 0515)  BP: (!) 133/93 (04/30/18 0515)  SpO2: 98 % (04/30/18 0759) Vital Signs (24h Range):  Temp:  [98 °F (36.7 °C)-100.8 °F (38.2 °C)] 100.3 °F (37.9  °C)  Pulse:  [] 129  Resp:  [17-26] 18  SpO2:  [95 %-99 %] 98 %  BP: (129-181)/() 133/93     Weight: 68.8 kg (151 lb 10.8 oz)  Body mass index is 24.48 kg/m².    Physical Exam   Constitutional: She is oriented to person, place, and time. She appears well-developed and well-nourished.   HENT:   Head: Normocephalic.   Eyes: Pupils are equal, round, and reactive to light.   Cardiovascular: Normal rate, regular rhythm and normal heart sounds.    Pulmonary/Chest: Effort normal and breath sounds normal. No respiratory distress. She has no wheezes. She has no rales.   Abdominal: Soft. She exhibits no mass. There is no rebound and no guarding.   Pain with gentle palpation in LLQ    Musculoskeletal: Normal range of motion. She exhibits no tenderness or deformity.   Neurological: She is alert and oriented to person, place, and time.   Skin: Skin is warm and dry.   Psychiatric: She has a normal mood and affect. Her behavior is normal. Judgment and thought content normal.   Nursing note and vitals reviewed.      Significant Labs:  BMP (Last 3 Results):   Recent Labs  Lab 04/28/18  0358 04/29/18  0242 04/30/18  0730   * 103 224*    141 134*   K 3.9 4.4 3.5    110 102   CO2 19* 23 22*   BUN 15 9 8   CREATININE 0.9 0.8 0.9   CALCIUM 9.4 9.1 8.7   MG 2.0 1.9 1.7     CBC (Last 3 Results):   Recent Labs  Lab 04/28/18  0358 04/29/18  0242 04/30/18  0730   WBC 8.86 8.05 9.35   RBC 4.38 4.41 4.16   HGB 12.3 12.5 11.9*   HCT 37.9 39.1 36.4*    329 310   MCV 87 89 88   MCH 28.1 28.3 28.6   MCHC 32.5 32.0 32.7     Prealbumin (Last 3 Results): No results for input(s): PREALBUMIN in the last 168 hours.    Significant Diagnostics:  CT a/p 4/29/18  Acute diverticulitis with small area of adjacent focal inflammatory change and air likely representing phlegmon with contained perforation.    Assessment/Plan:     Diverticulitis    73 year old female with new onset of aphasia which has improved, now with abd  pain, Ct pos for diverticulitis with small perforation    -NPO until abd pain improved  -continue current antibiotics  -pain control  -continue IVF  -nutrition labs  -serial abd exams  -may need IR drain            Above discussed with Dr. Coello, will review CT with IR for poss intervention  Please call CRS for any acute changes in abd exam  Will continue to follow with you  Thank you for your consult.     Shasta Mallory NP  Colorectal Surgery  [facility]

## 2018-04-30 NOTE — CONSULTS
[facility]  Physical Medicine & Rehab  Consult Note    Patient Name: Lyn Rapp  MRN: [id number]  Admission Date: 4/27/2018  Hospital Length of Stay: 3 days  Attending Physician: Bello Alex MD     Inpatient consult to Physical Medicine & Rehabilitation  Consult performed by: Renetta Hendricks NP  Consult requested by:  Bello Alex MD    Collaborating Physician: Jessica Marcial MD  Reason for Consult:  assess rehabilitation needs    Consults  Subjective:     Principal Problem: Embolic stroke involving left middle cerebral artery    HPI: Lyn Rapp is a 73-year-old female with PMHx of HTN, CAD, a-fib (stopped Coumadin 3 weeks ago), mitral valve prolapse, h/o melanoma on back (1994), GERD, and diverticulitis.  Patient presented to [facility] for acute onset aphasia and right-sided weakness.  Tele-stroke consult completed.  CTH without acute pathology, and IV tPA administered.  Transferred to [facility] on 4/27/18 for further evaluation and management.  Upon admission, CTA without large vessel occlusion; therefore, intervention not indicated.  MRI brain revealed small subacute L temporal/parietal infarct.  Etiology likely thromboembolic in the setting of known a-fib off coumadin.  Hospital course complicated by acute diverticulitis (on CT abdomen and pelvis), and she was started on IV Cefepime and Flagyl.      Functional History: Patient lives in [place] with her  in a single story home without steps to enter.  Prior to admission, she was (I) with ADLs, including driving and working, and mobility.  DME: none.    Hospital Course:   4/28/18:  Evaluated by PT and SLP.  Found to have aphasia and cognitive-linguistic impairment.  SLP recommendation: regular diet and thin liquids.  Bed mobility (I)-SBA.  Sit to stand SBA and transfers SBA.  Ambulated 10 ft SBA.  4/29/18:  Evaluated by OT.  Bed mobility (I).  Functional mobility and transfers SV.  ADLs SV.     Past Medical  History:   Diagnosis Date    Acid reflux     Coronary artery disease due to calcified coronary lesion 7/13/2016    nonobstructive    Hypertension     Melanoma 1994    Melanoma of back     Mitral valve prolapse      Past Surgical History:   Procedure Laterality Date    APPENDECTOMY      APPENDECTOMY      BREAST BIOPSY Right     benign excisional biopsy    CARDIAC CATHETERIZATION      CHOLECYSTECTOMY      COLONOSCOPY      HYSTERECTOMY  1982    MANDIBLE SURGERY      OOPHORECTOMY Bilateral 1982    TONSILLECTOMY      TUBAL LIGATION       Review of patient's allergies indicates:   Allergen Reactions    Ciprofloxacin Hives and Swelling     Swelling in throat    Bactrim [sulfamethoxazole-trimethoprim] Rash    Sulfur Hives    Toprol xl [metoprolol succinate] Other (See Comments)     Profuse sweating       Scheduled Medications:    aspirin  81 mg Oral Daily    atenolol  50 mg Oral Daily    atorvastatin  40 mg Oral Daily    benazepril  10 mg Oral Daily    ceFEPime (MAXIPIME) IVPB  2 g Intravenous Q12H    heparin (porcine)  5,000 Units Subcutaneous Q8H    metronidazole  500 mg Intravenous Q8H    sodium chloride 0.9%  3 mL Intravenous Q8H    sodium phosphate IVPB  20.01 mmol Intravenous Once       PRN Medications: acetaminophen, dextrose 50%, glucagon (human recombinant), hydrALAZINE, insulin aspart U-100, labetalol, morphine, ondansetron, polyethylene glycol, promethazine    Family History     Problem Relation (Age of Onset)    Hypertension Mother    No Known Problems Father        Social History Main Topics    Smoking status: Never Smoker    Smokeless tobacco: Never Used    Alcohol use No    Drug use: No    Sexual activity: Yes     Partners: Male     Review of Systems   Constitutional: Positive for activity change. Negative for chills, fatigue and fever.   HENT: Negative for drooling, hearing loss, trouble swallowing and voice change.    Eyes: Negative for pain and visual disturbance.    Respiratory: Negative for cough, shortness of breath and wheezing.    Cardiovascular: Negative for chest pain and palpitations.   Gastrointestinal: Positive for abdominal pain. Negative for abdominal distention, nausea and vomiting.   Genitourinary: Negative for difficulty urinating and flank pain.   Musculoskeletal: Negative for arthralgias, back pain, myalgias and neck pain.   Skin: Negative for rash and wound.   Neurological: Negative for dizziness, weakness, numbness and headaches. Speech difficulty: resolved.   Psychiatric/Behavioral: Negative for agitation and hallucinations. The patient is not nervous/anxious.      Objective:     Vital Signs (Most Recent):  Temp: 100.3 °F (37.9 °C) (18 0515)  Pulse: (!) 129 (18 1102)  Resp: 18 (18 0515)  BP: (!) 133/93 (18 0515)  SpO2: 98 % (18 0759)    Vital Signs (24h Range):  Temp:  [98 °F (36.7 °C)-100.8 °F (38.2 °C)] 100.3 °F (37.9 °C)  Pulse:  [] 129  Resp:  [17-26] 18  SpO2:  [95 %-99 %] 98 %  BP: (129-181)/() 133/93     Body mass index is 24.48 kg/m².    Physical Exam   Constitutional: She is oriented to person, place, and time. She appears well-developed and well-nourished. No distress.   HENT:   Head: Normocephalic and atraumatic.   Right Ear: External ear normal.   Left Ear: External ear normal.   Nose: Nose normal.   Eyes: Right eye exhibits no discharge. Left eye exhibits no discharge. No scleral icterus.   Neck: Normal range of motion.   Cardiovascular: Normal rate, regular rhythm and intact distal pulses.    Pulmonary/Chest: Effort normal. No respiratory distress. She has no wheezes.   Abdominal: Soft. There is tenderness.   Musculoskeletal: Normal range of motion. She exhibits no edema or tenderness.   Neurological: She is alert and oriented to person, place, and time. She exhibits normal muscle tone.   -  Mental Status:  AAOx3.  Follows commands.  Answers correct age and .  Recent and remote memory intact.   Recalls 3/3 objects.  No neglect.    -  Speech and language:  no aphasia or dysarthria.    -  Vision:  no hemianopsia or ptosis.    -  Facial movement (CN VII): symmetrical.   -  Motor:  No pronator drift. No focal weakness.    -  Tone:  Normal.   -  Sensory:  Intact to light touch and pin prick.   Skin: Skin is warm and dry. No rash noted.   Psychiatric: She has a normal mood and affect. Her behavior is normal. Thought content normal.   Vitals reviewed.    Diagnostic Results:   Labs: Reviewed  X-Ray: Reviewed  CT: Reviewed  MRI: Reviewed  CTA: Reviewed    Assessment/Plan:     * Embolic stroke involving left middle cerebral artery    -  Presented with acute onset aphasia and right-sided weakness  -  CTH without acute pathology s/p tPA  -  CTA without large vessel occlusion--> intervention not indicated  -  MRI brain revealed small subacute L temporal/parietal infarct  -  Etiology likely thromboembolic in the setting of known a-fib off coumadin    See hospital course for functional, cognitive/speech/language, and nutrition/swallow status.      Recommendations  -  Encourage mobility, OOB in chair, and early ambulation as appropriate   -  PT/OT evaluate and treat  -  SLP speech and cognitive evaluate and treat  -  Monitor mood and sleep disturbances  -  Establish consistent sleep-wake cycle  -  Monitor for bowel and bladder dysfunction  -  Monitor for shoulder pain and subluxation  -  Monitor for spasticity  -  Monitor for and prevent skin breakdown and pressure ulcers  · Early mobility, repositioning/weight shifting every 20-30 minutes when sitting, turn patient every 2 hours, proper mattress/overlay and chair cushioning, pressure relief/heel protector boots  -  DVT prophylaxis:  On [facility]        AF (paroxysmal atrial fibrillation)    -  Stroke risk factor   -  H/o a-fib  -  Previously on Coumadin-->  Reported off x 3 weeks         Close to premorbid level of function and without goals for inpatient rehab.  Recommend  outpatient SLP, if appropriate on SLP follow-up.  Will sign off.  Please call with questions/concerns or re-consult if situation changes.    Thank you for your consult.     RALPH Boyd  Department of Physical Medicine & Rehab  [facility]

## 2018-04-30 NOTE — ASSESSMENT & PLAN NOTE
Evident on CT abdomen/pelvis  NPO  Continue cefepime and flagyl  zofran and phenergan for nausea  Colorectal surgery consulted

## 2018-04-30 NOTE — ASSESSMENT & PLAN NOTE
- Stroke risk factor.  Patient newly diagnosed w/Afib.  Reportedly stopped taking coumadin about 3 weeks ago due to concerns about bleeding  - likely cardioembolic  - rate controlled on atenolol (home med)  - Will need to resume anticoagulation

## 2018-04-30 NOTE — SUBJECTIVE & OBJECTIVE
Neurologic Chief Complaint: R MCA    Subjective:     Interval History: Patient is seen for follow-up neurological assessment and treatment recommendations:  Patient complains of N/V and diarrhea with LUQ/LLQ pain    HPI, Past Medical, Family, and Social History remains the same as documented in the initial encounter.     Review of Systems   Constitutional: Negative for chills and fever.   HENT: Negative for trouble swallowing.    Eyes: Negative for visual disturbance.   Respiratory: Negative for cough and shortness of breath.    Cardiovascular: Negative for chest pain and palpitations.   Gastrointestinal: Positive for abdominal pain, diarrhea, nausea and vomiting.   Musculoskeletal: Negative for neck pain and neck stiffness.   Skin: Negative for rash and wound.   Neurological: Negative for dizziness, facial asymmetry, speech difficulty, weakness, numbness and headaches.     Scheduled Meds:   aspirin  81 mg Oral Daily    atenolol  50 mg Oral Daily    atorvastatin  40 mg Oral Daily    benazepril  10 mg Oral Daily    ceFEPime (MAXIPIME) IVPB  2 g Intravenous Q12H    heparin (porcine)  5,000 Units Subcutaneous Q8H    metronidazole  500 mg Intravenous Q8H    sodium chloride 0.9%  3 mL Intravenous Q8H    sodium phosphate IVPB  20.01 mmol Intravenous Once     Continuous Infusions:   dextrose 5 % and 0.45 % NaCl 1,000 mL (04/30/18 0654)     PRN Meds:acetaminophen, dextrose 50%, glucagon (human recombinant), hydrALAZINE, insulin aspart U-100, labetalol, ondansetron, polyethylene glycol, promethazine    Objective:     Vital Signs (Most Recent):  Temp: 100.3 °F (37.9 °C) (04/30/18 0515)  Pulse: (!) 124 (04/30/18 0759)  Resp: 18 (04/30/18 0515)  BP: (!) 133/93 (04/30/18 0515)  SpO2: 98 % (04/30/18 0759)  BP Location: Right arm    Vital Signs Range (Last 24H):  Temp:  [98 °F (36.7 °C)-100.8 °F (38.2 °C)]   Pulse:  []   Resp:  [17-29]   BP: (129-181)/()   SpO2:  [95 %-99 %]   BP Location: Right  arm    Physical Exam   Constitutional: She is oriented to person, place, and time. She appears well-developed and well-nourished. No distress.   HENT:   Head: Normocephalic and atraumatic.   Eyes: EOM are normal. Pupils are equal, round, and reactive to light.   Cardiovascular: Normal rate and regular rhythm.    Pulmonary/Chest: Effort normal. No respiratory distress. She has no rhonchi.   Abdominal: Soft. She exhibits no distension. There is no hepatosplenomegaly. There is tenderness.   Neurological: She is alert and oriented to person, place, and time.   Skin: Skin is warm and dry.   Vitals reviewed.      Neurological Exam:   LOC: alert  Attention Span: Good   Language: No aphasia  Articulation: No dysarthria  Orientation: Person, Place, Time   Visual Fields: Full  EOM (CN III, IV, VI): Full/intact  Pupils (CN II, III): PERRL  Facial Sensation (CN V): Normal  Facial Movement (CN VII): Symmetric facial expression    Motor: Arm left  Normal 5/5  Leg left  Normal 5/5  Arm right  Normal 5/5  Leg right Normal 5/5  Cebellar: No evidence of appendicular or axial ataxia  Sensation: Intact to light touch, temperature and vibration  Tone: Normal tone throughout    Laboratory:  CMP:     Recent Labs  Lab 04/30/18  0730   CALCIUM 8.7   ALBUMIN 3.2*   PROT 6.3   *   K 3.5   CO2 22*      BUN 8   CREATININE 0.9   ALKPHOS 51*   ALT 14   AST 30   BILITOT 0.6     CBC:     Recent Labs  Lab 04/30/18  0730   WBC 9.35   RBC 4.16   HGB 11.9*   HCT 36.4*      MCV 88   MCH 28.6   MCHC 32.7     Coagulation:     Recent Labs  Lab 04/27/18 2328 04/30/18  0730   INR 1.2  < > 1.1   APTT <21.0  --   --    < > = values in this interval not displayed.  TSH:     Recent Labs  Lab 04/27/18 2328   TSH 3.750       Diagnostic Results     Brain Imaging   CTH 4/27  No acute intracranial abnormality. Chronic changes     CTA head/neck 4/27  No acute abnormality. No high-grade stenosis or major vessel occlusion.    MRI brain. Date:  04/28/18  Small subacute left temporal parietal lobe infarct.  No mass effect or intracranial hemorrhage.    Mild scattered white matter disease, most consistent with chronic microvascular ischemic disease.    Vessel Imaging   CTA head/neck 4/27  No acute abnormality. No high-grade stenosis or major vessel occlusion.    Cardiac Imaging   2D echo 4/28    1 - Mildly depressed left ventricular systolic function (EF 45-50%).     2 - Concentric remodeling.     3 - Wall motion abnormalities.     4 - Low normal right ventricular systolic function .     5 - Mild to moderate mitral regurgitation.     6 - Trivial tricuspid regurgitation.     7 - The estimated PA systolic pressure is 28 mmHg.

## 2018-04-30 NOTE — HPI
Pt. si a 72 yo F with pmh of CAD, HTN, and a-fib who presents from OSH s/p TPA for acute onset aphasia. Pt. Was at home around 19:30 when she noted symptom onset.  was home at the time and she was able to communicate with him, however difficulty with word finding. She denies any weakness or change to sensation. She was able to ambulate without assistance. EMS was called and patient was transported to OSH shortly after symptom onset. After arrival to OSH, telestroke was consulted and tPa was administered at 21:01.      Pt. Was diagnosed with A-fib by her cardiologist ~ one and a half months ago. She was started on coumadin, however states that she had not received any additional blood work (presumably coumadin clinic) and thus stopped taking her coumadin. Pt. Takes ASA 81mg QHS.     Since admit aphasia has greatly improved, being seen by speech therapy    According to pt she does have hx of diverticulosis, developed abd pain about 2 weeks ago, saw her PCP who gave her antibiotics and after a few days she felt better, completed the course on Friday morning and then Friday nite develop acute onset of aphasia,  Had colonoscopy done 3 years ago at [facility] and was told she had divertiulosis.  Has never had an attack of diverticulitis prior to 2 weeks ago.

## 2018-04-30 NOTE — SUBJECTIVE & OBJECTIVE
PTA Medications   Medication Sig    [] amoxicillin-clavulanate 875-125mg (AUGMENTIN) 875-125 mg per tablet Take 1 tablet by mouth 3 (three) times daily.    aspirin (ECOTRIN) 81 MG EC tablet Take 81 mg by mouth once daily.    atenolol (TENORMIN) 25 MG tablet Take 1 tablet (25 mg total) by mouth once daily.    benazepril (LOTENSIN) 10 MG tablet Take 1 tablet (10 mg total) by mouth once daily. (Patient taking differently: Take 20 mg by mouth once daily. )    CALCIUM CARBONATE (CALCIUM 500 ORAL) Take 1,200 mg by mouth once daily.    cholecalciferol, vitamin D3, (VITAMIN D3) 2,000 unit Cap Take 1 capsule by mouth once daily.    cloNIDine (CATAPRES) 0.1 MG tablet Take 1 tablet (0.1 mg total) by mouth 3 (three) times daily.    escitalopram oxalate (LEXAPRO) 20 MG tablet TAKE ONE TABLET BY MOUTH ONCE DAILY    fluticasone (FLONASE) 50 mcg/actuation nasal spray 1 spray by Nasal route as needed.     FLUZONE HIGH-DOSE , PF, 180 mcg/0.5 mL vaccine ADM 0.5ML IM UTD    furosemide (LASIX) 20 MG tablet TAKE 1 TABLET(20 MG) BY MOUTH EVERY DAY (Patient taking differently: TAKE 1 TABLET(20 MG) BY MOUTH EVERY DAY prn)    hydroCHLOROthiazide (HYDRODIURIL) 25 MG tablet Take 1 tablet (25 mg total) by mouth once daily.    hydrocodone-acetaminophen 5-325mg (NORCO) 5-325 mg per tablet Take 1 tablet by mouth every 4 (four) hours as needed for Pain.    [] metroNIDAZOLE (FLAGYL) 500 MG tablet Take 1 tablet (500 mg total) by mouth 3 (three) times daily.    ondansetron (ZOFRAN-ODT) 4 MG TbDL Take 1 tablet (4 mg total) by mouth every 8 (eight) hours as needed.    triamcinolone acetonide 0.1% (KENALOG) 0.1 % cream Apply topically 2 (two) times daily.    warfarin (COUMADIN) 5 MG tablet Take 1 tablet (5 mg total) by mouth Daily.       Review of patient's allergies indicates:   Allergen Reactions    Ciprofloxacin Hives and Swelling     Swelling in throat    Bactrim [sulfamethoxazole-trimethoprim] Rash    Sulfur  Hives    Toprol xl [metoprolol succinate] Other (See Comments)     Profuse sweating       Past Medical History:   Diagnosis Date    Acid reflux     Coronary artery disease due to calcified coronary lesion 7/13/2016    nonobstructive    Hypertension     Melanoma 1994    Melanoma of back     Mitral valve prolapse      Past Surgical History:   Procedure Laterality Date    APPENDECTOMY      APPENDECTOMY      BREAST BIOPSY Right     benign excisional biopsy    CARDIAC CATHETERIZATION      CHOLECYSTECTOMY      COLONOSCOPY      HYSTERECTOMY  1982    MANDIBLE SURGERY      OOPHORECTOMY Bilateral 1982    TONSILLECTOMY      TUBAL LIGATION       Family History     Problem Relation (Age of Onset)    Hypertension Mother    No Known Problems Father        Social History Main Topics    Smoking status: Never Smoker    Smokeless tobacco: Never Used    Alcohol use No    Drug use: No    Sexual activity: Yes     Partners: Male     Review of Systems   Constitutional: Negative for appetite change, chills, fatigue, fever and unexpected weight change.   Respiratory: Negative for cough, chest tightness, shortness of breath and wheezing.    Cardiovascular: Negative for chest pain and leg swelling.   Gastrointestinal: Positive for abdominal pain. Negative for anal bleeding, blood in stool, constipation, diarrhea, nausea, rectal pain and vomiting.   Genitourinary: Negative for difficulty urinating, dysuria, flank pain, frequency and hematuria.   Musculoskeletal: Negative for back pain, gait problem and joint swelling.   Neurological: Negative for syncope, weakness and numbness.   Hematological: Does not bruise/bleed easily.   Psychiatric/Behavioral: Negative for agitation, confusion, decreased concentration and hallucinations. The patient is not nervous/anxious and is not hyperactive.      Objective:     Vital Signs (Most Recent):  Temp: 100.3 °F (37.9 °C) (04/30/18 0515)  Pulse: (!) 129 (04/30/18 1102)  Resp: 18 (04/30/18  0515)  BP: (!) 133/93 (04/30/18 0515)  SpO2: 98 % (04/30/18 0759) Vital Signs (24h Range):  Temp:  [98 °F (36.7 °C)-100.8 °F (38.2 °C)] 100.3 °F (37.9 °C)  Pulse:  [] 129  Resp:  [17-26] 18  SpO2:  [95 %-99 %] 98 %  BP: (129-181)/() 133/93     Weight: 68.8 kg (151 lb 10.8 oz)  Body mass index is 24.48 kg/m².    Physical Exam   Constitutional: She is oriented to person, place, and time. She appears well-developed and well-nourished.   HENT:   Head: Normocephalic.   Eyes: Pupils are equal, round, and reactive to light.   Cardiovascular: Normal rate, regular rhythm and normal heart sounds.    Pulmonary/Chest: Effort normal and breath sounds normal. No respiratory distress. She has no wheezes. She has no rales.   Abdominal: Soft. She exhibits no mass. There is no rebound and no guarding.   Pain with gentle palpation in LLQ    Musculoskeletal: Normal range of motion. She exhibits no tenderness or deformity.   Neurological: She is alert and oriented to person, place, and time.   Skin: Skin is warm and dry.   Psychiatric: She has a normal mood and affect. Her behavior is normal. Judgment and thought content normal.   Nursing note and vitals reviewed.      Significant Labs:  BMP (Last 3 Results):   Recent Labs  Lab 04/28/18  0358 04/29/18  0242 04/30/18  0730   * 103 224*    141 134*   K 3.9 4.4 3.5    110 102   CO2 19* 23 22*   BUN 15 9 8   CREATININE 0.9 0.8 0.9   CALCIUM 9.4 9.1 8.7   MG 2.0 1.9 1.7     CBC (Last 3 Results):   Recent Labs  Lab 04/28/18  0358 04/29/18  0242 04/30/18  0730   WBC 8.86 8.05 9.35   RBC 4.38 4.41 4.16   HGB 12.3 12.5 11.9*   HCT 37.9 39.1 36.4*    329 310   MCV 87 89 88   MCH 28.1 28.3 28.6   MCHC 32.5 32.0 32.7     Prealbumin (Last 3 Results): No results for input(s): PREALBUMIN in the last 168 hours.    Significant Diagnostics:  CT a/p 4/29/18  Acute diverticulitis with small area of adjacent focal inflammatory change and air likely representing phlegmon  with contained perforation.

## 2018-04-30 NOTE — ASSESSMENT & PLAN NOTE
73 y.o. female with significant past medical history of GERD, HTN, CAD, Afib, and diverticulitis presented to hospital as a transfer from [facility] ED for evaluation of L MCA stroke symptoms.  Patient received tPA.  CTA MP negative for LVO. Source is likely cardioembolic as patient has A Fib and stopped taking coumadin about 3 weeks ago.    MRI brain shows small subacute left temporal parietal lobe infarct.  No mass effect or intracranial hemorrhage.     Antithrombotics for secondary stroke prevention: Antiplatelets: Aspirin: 81 mg daily    Statins for secondary stroke prevention and hyperlipidemia, if present:   Statins: Atorvastatin- 20 mg daily    Aggressive risk factor modification: HTN, A-Fib     Rehab efforts: PT/OT/SLP to evaluate and treat    Diagnostics ordered/pending: none    VTE prophylaxis: Heparin 5000 units SQ every 8 hours    BP parameters: Infarct: Post tPA, SBP <180

## 2018-04-30 NOTE — HPI
Lyn Rapp is a 73-year-old female with PMHx of HTN, CAD, a-fib (stopped Coumadin 3 weeks ago), mitral valve prolapse, h/o melanoma on back (1994), GERD, and diverticulitis.  Patient presented to [facility] for acute onset aphasia and right-sided weakness.  Tele-stroke consult completed.  CTH without acute pathology, and IV tPA administered.  Transferred to [facility] [place]27/18 for further evaluation and management.  Upon admission, CTA without large vessel occlusion; therefore, intervention not indicated.  MRI brain revealed small subacute L temporal/parietal infarct.  Etiology likely thromboembolic in the setting of known a-fib off coumadin.  Hospital course complicated by acute diverticulitis (on CT abdomen and pelvis), and she was started on IV Cefepime and Flagyl.      Functional History: Patient lives in [place] with her  in a single story home without steps to enter.  Prior to admission, she was (I) with ADLs, including driving and working, and mobility.  DME: none.

## 2018-04-30 NOTE — SUBJECTIVE & OBJECTIVE
Past Medical History:   Diagnosis Date    Acid reflux     Coronary artery disease due to calcified coronary lesion 7/13/2016    nonobstructive    Hypertension     Melanoma 1994    Melanoma of back     Mitral valve prolapse      Past Surgical History:   Procedure Laterality Date    APPENDECTOMY      APPENDECTOMY      BREAST BIOPSY Right     benign excisional biopsy    CARDIAC CATHETERIZATION      CHOLECYSTECTOMY      COLONOSCOPY      HYSTERECTOMY  1982    MANDIBLE SURGERY      OOPHORECTOMY Bilateral 1982    TONSILLECTOMY      TUBAL LIGATION       Review of patient's allergies indicates:   Allergen Reactions    Ciprofloxacin Hives and Swelling     Swelling in throat    Bactrim [sulfamethoxazole-trimethoprim] Rash    Sulfur Hives    Toprol xl [metoprolol succinate] Other (See Comments)     Profuse sweating       Scheduled Medications:    aspirin  81 mg Oral Daily    atenolol  50 mg Oral Daily    atorvastatin  40 mg Oral Daily    benazepril  10 mg Oral Daily    ceFEPime (MAXIPIME) IVPB  2 g Intravenous Q12H    heparin (porcine)  5,000 Units Subcutaneous Q8H    metronidazole  500 mg Intravenous Q8H    sodium chloride 0.9%  3 mL Intravenous Q8H    sodium phosphate IVPB  20.01 mmol Intravenous Once       PRN Medications: acetaminophen, dextrose 50%, glucagon (human recombinant), hydrALAZINE, insulin aspart U-100, labetalol, morphine, ondansetron, polyethylene glycol, promethazine    Family History     Problem Relation (Age of Onset)    Hypertension Mother    No Known Problems Father        Social History Main Topics    Smoking status: Never Smoker    Smokeless tobacco: Never Used    Alcohol use No    Drug use: No    Sexual activity: Yes     Partners: Male     Review of Systems   Constitutional: Positive for activity change. Negative for chills, fatigue and fever.   HENT: Negative for drooling, hearing loss, trouble swallowing and voice change.    Eyes: Negative for pain and visual  disturbance.   Respiratory: Negative for cough, shortness of breath and wheezing.    Cardiovascular: Negative for chest pain and palpitations.   Gastrointestinal: Positive for abdominal pain. Negative for abdominal distention, nausea and vomiting.   Genitourinary: Negative for difficulty urinating and flank pain.   Musculoskeletal: Negative for arthralgias, back pain, myalgias and neck pain.   Skin: Negative for rash and wound.   Neurological: Negative for dizziness, weakness, numbness and headaches. Speech difficulty: resolved.   Psychiatric/Behavioral: Negative for agitation and hallucinations. The patient is not nervous/anxious.      Objective:     Vital Signs (Most Recent):  Temp: 100.3 °F (37.9 °C) (18 0515)  Pulse: (!) 129 (18 1102)  Resp: 18 (18 0515)  BP: (!) 133/93 (18 0515)  SpO2: 98 % (18 0759)    Vital Signs (24h Range):  Temp:  [98 °F (36.7 °C)-100.8 °F (38.2 °C)] 100.3 °F (37.9 °C)  Pulse:  [] 129  Resp:  [17-26] 18  SpO2:  [95 %-99 %] 98 %  BP: (129-181)/() 133/93     Body mass index is 24.48 kg/m².    Physical Exam   Constitutional: She is oriented to person, place, and time. She appears well-developed and well-nourished. No distress.   HENT:   Head: Normocephalic and atraumatic.   Right Ear: External ear normal.   Left Ear: External ear normal.   Nose: Nose normal.   Eyes: Right eye exhibits no discharge. Left eye exhibits no discharge. No scleral icterus.   Neck: Normal range of motion.   Cardiovascular: Normal rate, regular rhythm and intact distal pulses.    Pulmonary/Chest: Effort normal. No respiratory distress. She has no wheezes.   Abdominal: Soft. There is tenderness.   Musculoskeletal: Normal range of motion. She exhibits no edema or tenderness.   Neurological: She is alert and oriented to person, place, and time. She exhibits normal muscle tone.   -  Mental Status:  AAOx3.  Follows commands.  Answers correct age and .  Recent and remote memory  intact.  Recalls 3/3 objects.  No neglect.    -  Speech and language:  no aphasia or dysarthria.    -  Vision:  no hemianopsia or ptosis.    -  Facial movement (CN VII): symmetrical.   -  Motor:  No pronator drift. No focal weakness.    -  Tone:  Normal.   -  Sensory:  Intact to light touch and pin prick.   Skin: Skin is warm and dry. No rash noted.   Psychiatric: She has a normal mood and affect. Her behavior is normal. Thought content normal.   Vitals reviewed.    NEUROLOGICAL EXAMINATION:     MENTAL STATUS   Oriented to person, place, and time.       Diagnostic Results:   Labs: Reviewed  X-Ray: Reviewed  CT: Reviewed  MRI: Reviewed  CTA: Reviewed

## 2018-04-30 NOTE — HOSPITAL COURSE
4/28/18:  Evaluated by PT and SLP.  Found to have aphasia and cognitive-linguistic impairment.  SLP recommendation: regular diet and thin liquids.  Bed mobility (I)-SBA.  Sit to stand SBA and transfers SBA.  Ambulated 10 ft SBA.  4/29/18:  Evaluated by OT.  Bed mobility (I).  Functional mobility and transfers SV.  ADLs SV.

## 2018-04-30 NOTE — ASSESSMENT & PLAN NOTE
-  Presented with acute onset aphasia and right-sided weakness  -  CTH without acute pathology s/p tPA  -  CTA without large vessel occlusion--> intervention not indicated  -  MRI brain revealed small subacute L temporal/parietal infarct  -  Etiology likely thromboembolic in the setting of known a-fib off coumadin    See hospital course for functional, cognitive/speech/language, and nutrition/swallow status.      Recommendations  -  Encourage mobility, OOB in chair, and early ambulation as appropriate   -  PT/OT evaluate and treat  -  SLP speech and cognitive evaluate and treat  -  Monitor mood and sleep disturbances  -  Establish consistent sleep-wake cycle  -  Monitor for bowel and bladder dysfunction  -  Monitor for shoulder pain and subluxation  -  Monitor for spasticity  -  Monitor for and prevent skin breakdown and pressure ulcers  · Early mobility, repositioning/weight shifting every 20-30 minutes when sitting, turn patient every 2 hours, proper mattress/overlay and chair cushioning, pressure relief/heel protector boots  -  DVT prophylaxis:  On [facility]

## 2018-04-30 NOTE — PROGRESS NOTES
[facility]  Vascular Neurology  Comprehensive Stroke Center  Progress Note    Assessment/Plan:     * Embolic stroke involving left middle cerebral artery    73 y.o. female with significant past medical history of GERD, HTN, CAD, Afib, and diverticulitis presented to hospital as a transfer from [facility] ED for evaluation of L MCA stroke symptoms.  Patient received tPA.  CTA MP negative for LVO. Source is likely cardioembolic as patient has A Fib and stopped taking coumadin about 3 weeks ago.    MRI brain shows small subacute left temporal parietal lobe infarct.  No mass effect or intracranial hemorrhage.     Antithrombotics for secondary stroke prevention: Antiplatelets: Aspirin: 81 mg daily    Statins for secondary stroke prevention and hyperlipidemia, if present:   Statins: Atorvastatin- 20 mg daily    Aggressive risk factor modification: HTN, A-Fib     Rehab efforts: PT/OT/SLP to evaluate and treat    Diagnostics ordered/pending: none    VTE prophylaxis: Heparin 5000 units SQ every 8 hours    BP parameters: Infarct: Post tPA, SBP <180            Diverticulitis    Evident on CT abdomen/pelvis  NPO  Continue cefepime and flagyl  zofran and phenergan for nausea  Colorectal surgery consulted        S/P admn tPA in diff fac w/n last 24 hr bef adm to crnt fac    -Patient admitted to [facility] for close montioring.        AF (paroxysmal atrial fibrillation)    - Stroke risk factor.  Patient newly diagnosed w/Afib.  Reportedly stopped taking coumadin about 3 weeks ago due to concerns about bleeding  - likely cardioembolic  - rate controlled on atenolol (home med)  - Will need to resume anticoagulation        Hyperlipidemia    -Stroke risk factor.  LDL 58.6.  -Atorvastatin 20 mg daily        Benign essential HTN    -Stroke risk factor  -atenolol and benazepril  -SBP<180.          Carotid arterial disease    -Stroke risk factor.  No hemodynamically significant stenosis on CTA MP.              4/27: Admitted to [facility] s/p tPA at OSH for L MCA stroke symptoms, likely thromboembolic in origin. CTH and CTA head/neck showed no acute abnormality, no high-grade stenosis or major vessel occlusion  4/28: Aphasia improved overnight. Patient with mild residual expressive. Improvement in right sided strength. HR in 120s. Holding anti-coagulation due to recent tPA  4/29: No acute events. MRI brain showed small subacute left temporal parietal lobe infarct, no mass effect or intracranial hemorrhage. Further improvement in speech. Expressive aphasia has resolved. Right UE/LE strength improved. HR better controlled with atenolol (home med). Can re-start anticoagulation. Stepdown to floor  4/30: CT abdomen pelvis with acute diverticulitis and phlegmon with contained perforation, started on cefepime and flagyl, consulted colorectal surgery    STROKE DOCUMENTATION   Acute Stroke Times   Last Known Normal Date: 04/27/18  Last Known Normal Time: 1930  Symptom Onset Date: 04/27/18  Symptom Onset Time: 1930  Stroke Team Called Date: 04/27/18  Stroke Team Called Time: 2020  Stroke Team Arrival Date: 04/27/18  Stroke Team Arrival Time: 2022  CT Interpretation Time: 2030  Decision to Treat Time for Alteplase: 2045  Decision to Treat Time for IR: 0000 (not an IR candidate)    NIH Scale:  1a. Level Of Consciousness: 0-->Alert: keenly responsive  1b. LOC Questions: 0-->Answers both questions correctly  1c. LOC Commands: 0-->Performs both tasks correctly  2. Best Gaze: 0-->Normal  3. Visual: 0-->No visual loss  4. Facial Palsy: 0-->Normal symmetrical movements  5a. Motor Arm, Left: 0-->No drift: limb holds 90 (or 45) degrees for full 10 secs  5b. Motor Arm, Right: 0-->No drift: limb holds 90 (or 45) degrees for full 10 secs  6a. Motor Leg, Left: 0-->No drift: leg holds 30 degree position for full 5 secs  6b. Motor Leg, Right: 0-->No drift: leg holds 30 degree position for full 5 secs  7. Limb Ataxia: 0-->Absent  8. Sensory:  0-->Normal: no sensory loss  9. Best Language: 0-->No aphasia: normal  10. Dysarthria: 0-->Normal  11. Extinction and Inattention (formerly Neglect): 0-->No abnormality  Total (NIH Stroke Scale): 0       Modified Strafford Score: 1  King Coma Scale:    ABCD2 Score:    [id number] Score:5  HAS -BLED Score:3  ICH Score:   Hunt & Garber Classification:      Hemorrhagic change of an Ischemic Stroke: Does this patient have an ischemic stroke with hemorrhagic changes? No     Neurologic Chief Complaint: R MCA    Subjective:     Interval History: Patient is seen for follow-up neurological assessment and treatment recommendations:  Patient complains of N/V and diarrhea with LUQ/LLQ pain    HPI, Past Medical, Family, and Social History remains the same as documented in the initial encounter.     Review of Systems   Constitutional: Negative for chills and fever.   HENT: Negative for trouble swallowing.    Eyes: Negative for visual disturbance.   Respiratory: Negative for cough and shortness of breath.    Cardiovascular: Negative for chest pain and palpitations.   Gastrointestinal: Positive for abdominal pain, diarrhea, nausea and vomiting.   Musculoskeletal: Negative for neck pain and neck stiffness.   Skin: Negative for rash and wound.   Neurological: Negative for dizziness, facial asymmetry, speech difficulty, weakness, numbness and headaches.     Scheduled Meds:   aspirin  81 mg Oral Daily    atenolol  50 mg Oral Daily    atorvastatin  40 mg Oral Daily    benazepril  10 mg Oral Daily    ceFEPime (MAXIPIME) IVPB  2 g Intravenous Q12H    heparin (porcine)  5,000 Units Subcutaneous Q8H    metronidazole  500 mg Intravenous Q8H    sodium chloride 0.9%  3 mL Intravenous Q8H    sodium phosphate IVPB  20.01 mmol Intravenous Once     Continuous Infusions:   dextrose 5 % and 0.45 % NaCl 1,000 mL (04/30/18 6758)     PRN Meds:acetaminophen, dextrose 50%, glucagon (human recombinant), hydrALAZINE, insulin aspart U-100,  labetalol, ondansetron, polyethylene glycol, promethazine    Objective:     Vital Signs (Most Recent):  Temp: 100.3 °F (37.9 °C) (04/30/18 0515)  Pulse: (!) 124 (04/30/18 0759)  Resp: 18 (04/30/18 0515)  BP: (!) 133/93 (04/30/18 0515)  SpO2: 98 % (04/30/18 0759)  BP Location: Right arm    Vital Signs Range (Last 24H):  Temp:  [98 °F (36.7 °C)-100.8 °F (38.2 °C)]   Pulse:  []   Resp:  [17-29]   BP: (129-181)/()   SpO2:  [95 %-99 %]   BP Location: Right arm    Physical Exam   Constitutional: She is oriented to person, place, and time. She appears well-developed and well-nourished. No distress.   HENT:   Head: Normocephalic and atraumatic.   Eyes: EOM are normal. Pupils are equal, round, and reactive to light.   Cardiovascular: Normal rate and regular rhythm.    Pulmonary/Chest: Effort normal. No respiratory distress. She has no rhonchi.   Abdominal: Soft. She exhibits no distension. There is no hepatosplenomegaly. There is tenderness.   Neurological: She is alert and oriented to person, place, and time.   Skin: Skin is warm and dry.   Vitals reviewed.      Neurological Exam:   LOC: alert  Attention Span: Good   Language: No aphasia  Articulation: No dysarthria  Orientation: Person, Place, Time   Visual Fields: Full  EOM (CN III, IV, VI): Full/intact  Pupils (CN II, III): PERRL  Facial Sensation (CN V): Normal  Facial Movement (CN VII): Symmetric facial expression    Motor: Arm left  Normal 5/5  Leg left  Normal 5/5  Arm right  Normal 5/5  Leg right Normal 5/5  Cebellar: No evidence of appendicular or axial ataxia  Sensation: Intact to light touch, temperature and vibration  Tone: Normal tone throughout    Laboratory:  CMP:     Recent Labs  Lab 04/30/18  0730   CALCIUM 8.7   ALBUMIN 3.2*   PROT 6.3   *   K 3.5   CO2 22*      BUN 8   CREATININE 0.9   ALKPHOS 51*   ALT 14   AST 30   BILITOT 0.6     CBC:     Recent Labs  Lab 04/30/18  0730   WBC 9.35   RBC 4.16   HGB 11.9*   HCT 36.4*       MCV 88   MCH 28.6   MCHC 32.7     Coagulation:     Recent Labs  Lab 04/27/18 2328 04/30/18  0730   INR 1.2  < > 1.1   APTT <21.0  --   --    < > = values in this interval not displayed.  TSH:     Recent Labs  Lab 04/27/18 2328   TSH 3.750       Diagnostic Results     Brain Imaging   CTH 4/27  No acute intracranial abnormality. Chronic changes     CTA head/neck 4/27  No acute abnormality. No high-grade stenosis or major vessel occlusion.    MRI brain. Date: 04/28/18  Small subacute left temporal parietal lobe infarct.  No mass effect or intracranial hemorrhage.    Mild scattered white matter disease, most consistent with chronic microvascular ischemic disease.    Vessel Imaging   CTA head/neck 4/27  No acute abnormality. No high-grade stenosis or major vessel occlusion.    Cardiac Imaging   2D echo 4/28    1 - Mildly depressed left ventricular systolic function (EF 45-50%).     2 - Concentric remodeling.     3 - Wall motion abnormalities.     4 - Low normal right ventricular systolic function .     5 - Mild to moderate mitral regurgitation.     6 - Trivial tricuspid regurgitation.     7 - The estimated PA systolic pressure is 28 mmHg.               Ashley Phoenix PA-C  Comprehensive Stroke Center  Department of Vascular Neurology   [facility]

## 2018-05-01 LAB
ALBUMIN SERPL BCP-MCNC: 2.8 G/DL
ALBUMIN SERPL BCP-MCNC: 2.8 G/DL
ALP SERPL-CCNC: 48 U/L
ALP SERPL-CCNC: 50 U/L
ALT SERPL W/O P-5'-P-CCNC: 18 U/L
ALT SERPL W/O P-5'-P-CCNC: 21 U/L
ANION GAP SERPL CALC-SCNC: 7 MMOL/L
ANION GAP SERPL CALC-SCNC: 8 MMOL/L
AST SERPL-CCNC: 41 U/L
AST SERPL-CCNC: 45 U/L
BASOPHILS # BLD AUTO: 0.04 K/UL
BASOPHILS # BLD AUTO: 0.05 K/UL
BASOPHILS NFR BLD: 0.4 %
BASOPHILS NFR BLD: 0.5 %
BILIRUB SERPL-MCNC: 0.7 MG/DL
BILIRUB SERPL-MCNC: 0.7 MG/DL
BUN SERPL-MCNC: 7 MG/DL
BUN SERPL-MCNC: 7 MG/DL
CALCIUM SERPL-MCNC: 8.2 MG/DL
CALCIUM SERPL-MCNC: 8.6 MG/DL
CHLORIDE SERPL-SCNC: 99 MMOL/L
CHLORIDE SERPL-SCNC: 99 MMOL/L
CO2 SERPL-SCNC: 24 MMOL/L
CO2 SERPL-SCNC: 25 MMOL/L
CREAT SERPL-MCNC: 0.8 MG/DL
CREAT SERPL-MCNC: 0.8 MG/DL
DIFFERENTIAL METHOD: ABNORMAL
DIFFERENTIAL METHOD: ABNORMAL
EOSINOPHIL # BLD AUTO: 0.1 K/UL
EOSINOPHIL # BLD AUTO: 0.1 K/UL
EOSINOPHIL NFR BLD: 0.4 %
EOSINOPHIL NFR BLD: 0.5 %
ERYTHROCYTE [DISTWIDTH] IN BLOOD BY AUTOMATED COUNT: 14.5 %
ERYTHROCYTE [DISTWIDTH] IN BLOOD BY AUTOMATED COUNT: 14.6 %
EST. GFR  (AFRICAN AMERICAN): >60 ML/MIN/1.73 M^2
EST. GFR  (AFRICAN AMERICAN): >60 ML/MIN/1.73 M^2
EST. GFR  (NON AFRICAN AMERICAN): >60 ML/MIN/1.73 M^2
EST. GFR  (NON AFRICAN AMERICAN): >60 ML/MIN/1.73 M^2
GLUCOSE SERPL-MCNC: 145 MG/DL
GLUCOSE SERPL-MCNC: 148 MG/DL
HCT VFR BLD AUTO: 35.4 %
HCT VFR BLD AUTO: 36.4 %
HGB BLD-MCNC: 11.5 G/DL
HGB BLD-MCNC: 11.8 G/DL
IMM GRANULOCYTES # BLD AUTO: 0.05 K/UL
IMM GRANULOCYTES # BLD AUTO: 0.05 K/UL
IMM GRANULOCYTES NFR BLD AUTO: 0.4 %
IMM GRANULOCYTES NFR BLD AUTO: 0.5 %
INR PPP: 1
LYMPHOCYTES # BLD AUTO: 1.5 K/UL
LYMPHOCYTES # BLD AUTO: 1.6 K/UL
LYMPHOCYTES NFR BLD: 13.6 %
LYMPHOCYTES NFR BLD: 13.9 %
MAGNESIUM SERPL-MCNC: 1.7 MG/DL
MAGNESIUM SERPL-MCNC: 1.8 MG/DL
MCH RBC QN AUTO: 28 PG
MCH RBC QN AUTO: 28.5 PG
MCHC RBC AUTO-ENTMCNC: 32.4 G/DL
MCHC RBC AUTO-ENTMCNC: 32.5 G/DL
MCV RBC AUTO: 86 FL
MCV RBC AUTO: 88 FL
MONOCYTES # BLD AUTO: 0.9 K/UL
MONOCYTES # BLD AUTO: 0.9 K/UL
MONOCYTES NFR BLD: 7.9 %
MONOCYTES NFR BLD: 8.1 %
NEUTROPHILS # BLD AUTO: 8.6 K/UL
NEUTROPHILS # BLD AUTO: 8.7 K/UL
NEUTROPHILS NFR BLD: 76.8 %
NEUTROPHILS NFR BLD: 77 %
NRBC BLD-RTO: 0 /100 WBC
NRBC BLD-RTO: 0 /100 WBC
PHOSPHATE SERPL-MCNC: 2.2 MG/DL
PHOSPHATE SERPL-MCNC: 2.3 MG/DL
PLATELET # BLD AUTO: 297 K/UL
PLATELET # BLD AUTO: 310 K/UL
PMV BLD AUTO: 10.3 FL
PMV BLD AUTO: 10.6 FL
POCT GLUCOSE: 114 MG/DL (ref 70–110)
POCT GLUCOSE: 159 MG/DL (ref 70–110)
POTASSIUM SERPL-SCNC: 3.7 MMOL/L
POTASSIUM SERPL-SCNC: 4.1 MMOL/L
PROT SERPL-MCNC: 5.9 G/DL
PROT SERPL-MCNC: 5.9 G/DL
PROTHROMBIN TIME: 10.7 SEC
RBC # BLD AUTO: 4.1 M/UL
RBC # BLD AUTO: 4.14 M/UL
SODIUM SERPL-SCNC: 131 MMOL/L
SODIUM SERPL-SCNC: 131 MMOL/L
SODIUM SERPL-SCNC: 134 MMOL/L
WBC # BLD AUTO: 11.07 K/UL
WBC # BLD AUTO: 11.37 K/UL

## 2018-05-01 PROCEDURE — A4216 STERILE WATER/SALINE, 10 ML: HCPCS | Performed by: STUDENT IN AN ORGANIZED HEALTH CARE EDUCATION/TRAINING PROGRAM

## 2018-05-01 PROCEDURE — 25000003 PHARM REV CODE 250: Performed by: STUDENT IN AN ORGANIZED HEALTH CARE EDUCATION/TRAINING PROGRAM

## 2018-05-01 PROCEDURE — 99233 SBSQ HOSP IP/OBS HIGH 50: CPT | Mod: GC,,, | Performed by: PSYCHIATRY & NEUROLOGY

## 2018-05-01 PROCEDURE — 63600175 PHARM REV CODE 636 W HCPCS: Performed by: NURSE PRACTITIONER

## 2018-05-01 PROCEDURE — 20600001 HC STEP DOWN PRIVATE ROOM

## 2018-05-01 PROCEDURE — 25000003 PHARM REV CODE 250: Performed by: NURSE PRACTITIONER

## 2018-05-01 PROCEDURE — 99233 SBSQ HOSP IP/OBS HIGH 50: CPT | Mod: ,,, | Performed by: COLON & RECTAL SURGERY

## 2018-05-01 PROCEDURE — 84295 ASSAY OF SERUM SODIUM: CPT

## 2018-05-01 PROCEDURE — 84100 ASSAY OF PHOSPHORUS: CPT | Mod: 91

## 2018-05-01 PROCEDURE — 63600175 PHARM REV CODE 636 W HCPCS: Performed by: PHYSICIAN ASSISTANT

## 2018-05-01 PROCEDURE — 80053 COMPREHEN METABOLIC PANEL: CPT | Mod: 91

## 2018-05-01 PROCEDURE — 85610 PROTHROMBIN TIME: CPT

## 2018-05-01 PROCEDURE — S0030 INJECTION, METRONIDAZOLE: HCPCS | Performed by: NURSE PRACTITIONER

## 2018-05-01 PROCEDURE — 85025 COMPLETE CBC W/AUTO DIFF WBC: CPT

## 2018-05-01 PROCEDURE — 36415 COLL VENOUS BLD VENIPUNCTURE: CPT

## 2018-05-01 PROCEDURE — 25000003 PHARM REV CODE 250: Performed by: PHYSICIAN ASSISTANT

## 2018-05-01 PROCEDURE — 83735 ASSAY OF MAGNESIUM: CPT

## 2018-05-01 RX ADMIN — ATORVASTATIN CALCIUM 40 MG: 20 TABLET, FILM COATED ORAL at 08:05

## 2018-05-01 RX ADMIN — Medication 3 ML: at 06:05

## 2018-05-01 RX ADMIN — Medication 3 ML: at 10:05

## 2018-05-01 RX ADMIN — ASPIRIN 81 MG: 81 TABLET, COATED ORAL at 08:05

## 2018-05-01 RX ADMIN — CEFEPIME HYDROCHLORIDE 2 G: 2 INJECTION, POWDER, FOR SOLUTION INTRAVENOUS at 05:05

## 2018-05-01 RX ADMIN — Medication 3 ML: at 01:05

## 2018-05-01 RX ADMIN — HEPARIN SODIUM 5000 UNITS: 5000 INJECTION, SOLUTION INTRAVENOUS; SUBCUTANEOUS at 05:05

## 2018-05-01 RX ADMIN — METRONIDAZOLE 500 MG: 500 INJECTION, SOLUTION INTRAVENOUS at 05:05

## 2018-05-01 RX ADMIN — HEPARIN SODIUM 5000 UNITS: 5000 INJECTION, SOLUTION INTRAVENOUS; SUBCUTANEOUS at 09:05

## 2018-05-01 RX ADMIN — SODIUM CHLORIDE 500 ML: 0.9 INJECTION, SOLUTION INTRAVENOUS at 08:05

## 2018-05-01 RX ADMIN — METRONIDAZOLE 500 MG: 500 INJECTION, SOLUTION INTRAVENOUS at 09:05

## 2018-05-01 RX ADMIN — SODIUM PHOSPHATE, MONOBASIC, MONOHYDRATE 30 MMOL: 276; 142 INJECTION, SOLUTION INTRAVENOUS at 08:05

## 2018-05-01 RX ADMIN — HEPARIN SODIUM 5000 UNITS: 5000 INJECTION, SOLUTION INTRAVENOUS; SUBCUTANEOUS at 01:05

## 2018-05-01 RX ADMIN — BENAZEPRIL HYDROCHLORIDE 10 MG: 10 TABLET, FILM COATED ORAL at 08:05

## 2018-05-01 RX ADMIN — ATENOLOL 50 MG: 50 TABLET ORAL at 08:05

## 2018-05-01 RX ADMIN — METRONIDAZOLE 500 MG: 500 INJECTION, SOLUTION INTRAVENOUS at 01:05

## 2018-05-01 NOTE — SUBJECTIVE & OBJECTIVE
Neurologic Chief Complaint: R MCA    Subjective:     Interval History: Patient is seen for follow-up neurological assessment and treatment recommendations:  NAEON, abdominal pain persisting, N/V resolved  HPI, Past Medical, Family, and Social History remains the same as documented in the initial encounter.     Review of Systems   Constitutional: Negative for chills and fever.   HENT: Negative for trouble swallowing.    Eyes: Negative for visual disturbance.   Respiratory: Negative for cough and shortness of breath.    Cardiovascular: Negative for chest pain and palpitations.   Gastrointestinal: Positive for abdominal pain. Negative for diarrhea, nausea and vomiting.   Musculoskeletal: Negative for neck pain and neck stiffness.   Skin: Negative for rash and wound.   Neurological: Negative for dizziness, facial asymmetry, speech difficulty, weakness, numbness and headaches.     Scheduled Meds:   aspirin  81 mg Oral Daily    atenolol  50 mg Oral Daily    atorvastatin  40 mg Oral Daily    benazepril  10 mg Oral Daily    ceFEPime (MAXIPIME) IVPB  2 g Intravenous Q12H    heparin (porcine)  5,000 Units Subcutaneous Q8H    metronidazole  500 mg Intravenous Q8H    sodium chloride 0.9%  3 mL Intravenous Q8H    sodium phosphate IVPB  30 mmol Intravenous Once     Continuous Infusions:    PRN Meds:acetaminophen, dextrose 50%, glucagon (human recombinant), hydrALAZINE, insulin aspart U-100, labetalol, morphine, ondansetron, polyethylene glycol, promethazine    Objective:     Vital Signs (Most Recent):  Temp: 98.1 °F (36.7 °C) (05/01/18 0022)  Pulse: 103 (05/01/18 0800)  Resp: 18 (05/01/18 0022)  BP: 124/66 (05/01/18 0022)  SpO2: 95 % (05/01/18 0022)  BP Location: Left arm    Vital Signs Range (Last 24H):  Temp:  [98.1 °F (36.7 °C)-99.1 °F (37.3 °C)]   Pulse:  []   Resp:  [16-18]   BP: (124-173)/(66-96)   SpO2:  [91 %-98 %]   BP Location: Left arm    Physical Exam   Constitutional: She is oriented to person, place,  and time. She appears well-developed and well-nourished. No distress.   HENT:   Head: Normocephalic and atraumatic.   Eyes: EOM are normal. Pupils are equal, round, and reactive to light.   Cardiovascular: Normal rate and regular rhythm.    Pulmonary/Chest: Effort normal. No respiratory distress. She has no rhonchi.   Abdominal: Soft. She exhibits no distension. There is no hepatosplenomegaly. There is tenderness.   Neurological: She is alert and oriented to person, place, and time.   Skin: Skin is warm and dry.   Vitals reviewed.      Neurological Exam:   LOC: alert  Attention Span: Good   Language: No aphasia  Articulation: No dysarthria  Orientation: Person, Place, Time   Visual Fields: Full  EOM (CN III, IV, VI): Full/intact  Pupils (CN II, III): PERRL  Facial Sensation (CN V): Normal  Facial Movement (CN VII): Symmetric facial expression    Motor: Arm left  Normal 5/5  Leg left  Normal 5/5  Arm right  Normal 5/5  Leg right Normal 5/5  Cebellar: No evidence of appendicular or axial ataxia  Sensation: Intact to light touch, temperature and vibration  Tone: Normal tone throughout    Laboratory:  CMP:     Recent Labs  Lab 05/01/18 0428   CALCIUM 8.2*   ALBUMIN 2.8*   PROT 5.9*   *   K 3.7   CO2 24   CL 99   BUN 7*   CREATININE 0.8   ALKPHOS 48*   ALT 18   AST 41*   BILITOT 0.7     CBC:     Recent Labs  Lab 05/01/18 0428   WBC 11.37  11.07   RBC 4.10  4.14   HGB 11.5*  11.8*   HCT 35.4*  36.4*     297   MCV 86  88   MCH 28.0  28.5   MCHC 32.5  32.4     Coagulation:     Recent Labs  Lab 04/27/18 2328 05/01/18 0428   INR 1.2  < > 1.0   APTT <21.0  --   --    < > = values in this interval not displayed.  TSH:     Recent Labs  Lab 04/27/18 2328   TSH 3.750       Diagnostic Results     Brain Imaging   CTH 4/27  No acute intracranial abnormality. Chronic changes     CTA head/neck 4/27  No acute abnormality. No high-grade stenosis or major vessel occlusion.    MRI brain. Date: 04/28/18  Small  subacute left temporal parietal lobe infarct.  No mass effect or intracranial hemorrhage.    Mild scattered white matter disease, most consistent with chronic microvascular ischemic disease.    Vessel Imaging   CTA head/neck 4/27  No acute abnormality. No high-grade stenosis or major vessel occlusion.    Cardiac Imaging   2D echo 4/28    1 - Mildly depressed left ventricular systolic function (EF 45-50%).     2 - Concentric remodeling.     3 - Wall motion abnormalities.     4 - Low normal right ventricular systolic function .     5 - Mild to moderate mitral regurgitation.     6 - Trivial tricuspid regurgitation.     7 - The estimated PA systolic pressure is 28 mmHg.

## 2018-05-01 NOTE — PLAN OF CARE
PCP:Slime Thomas MD    Extended Emergency Contact Information  Primary Emergency Contact: Michoacano Rapp  Address: [address] [place], [place] [id number] [place] [address] of [place]  Home Phone: [phone]  Mobile Phone: [phone]  Relation: Spouse      [facility] - [place], [place] - [address]  [address]  [place] [address]  Phone: [phone] Fax: [phone]    [id number] - [place], [place] - [address] AT [facility] [address]  [address]  [place] [address]  Phone: [phone] Fax: [phone]    Payor: MEDICARE / Plan: MEDICARE PART A & B / Product Type: Government /     Patient was independent living with her spouse prior to hospitalization. PT/OT/SLP are recommending home with no needs. Cm will continue to follow.     04/30/18 1030   Discharge Assessment   Assessment Type Discharge Planning Assessment   Confirmed/corrected address and phone number on facesheet? Yes   Assessment information obtained from? Caregiver   Expected Length of Stay (days) 2   Communicated expected length of stay with patient/caregiver yes   Prior to hospitilization cognitive status: Alert/Oriented   Prior to hospitalization functional status: Independent   Current cognitive status: Alert/Oriented   Current Functional Status: Independent   Facility Arrived From: [facility] With spouse   Able to Return to Prior Arrangements yes   Is patient able to care for self after discharge? Yes   Who are your caregiver(s) and their phone number(s)? Michoacano Rapp (spouse) [phone]   Patient's perception of discharge disposition home or selfcare   Readmission Within The Last 30 Days no previous admission in last 30 days   Patient currently being followed by outpatient case management? No   Patient currently receives any other outside agency services? No   Equipment Currently Used at Home none   Do you have any problems affording any of your prescribed  medications? No   Is the patient taking medications as prescribed? yes   Does the patient have transportation home? Yes   Transportation Available car;family or friend will provide   Does the patient receive services at the [facility]madin Clinic? No   Discharge Plan A Home with family   Discharge Plan B Home with family   Patient/Family In Agreement With Plan yes

## 2018-05-01 NOTE — ASSESSMENT & PLAN NOTE
73 year old female with new onset of aphasia which has improved, now with abd pain, Ct pos for diverticulitis with small perforation    No drainable fluid collection  Continue IV abx  OK for ice chips  Will continue to follow

## 2018-05-01 NOTE — ASSESSMENT & PLAN NOTE
Evident on CT abdomen/pelvis  Starting clear liquid diet  Continue cefepime and flagyl  zofran and phenergan for nausea  Colorectal surgery consulted

## 2018-05-01 NOTE — PROGRESS NOTES
[facility]  Vascular Neurology  Comprehensive Stroke Center  Progress Note    Assessment/Plan:     * Embolic stroke involving left middle cerebral artery    73 y.o. female with significant past medical history of GERD, HTN, CAD, Afib, and diverticulitis presented to hospital as a transfer from [facility] ED for evaluation of L MCA stroke symptoms.  Patient received tPA.  CTA MP negative for LVO. Source is likely cardioembolic as patient has A Fib and stopped taking coumadin about 3 weeks ago.    MRI brain shows small subacute left temporal parietal lobe infarct.  No mass effect or intracranial hemorrhage.     Antithrombotics for secondary stroke prevention: Antiplatelets: Aspirin: 81 mg daily    Statins for secondary stroke prevention and hyperlipidemia, if present:   Statins: Atorvastatin- 20 mg daily    Aggressive risk factor modification: HTN, A-Fib     Rehab efforts: PT/OT/SLP to evaluate and treat    Diagnostics ordered/pending: none    VTE prophylaxis: Heparin 5000 units SQ every 8 hours    BP parameters: Infarct: Post tPA, SBP <180            Diverticulitis    Evident on CT abdomen/pelvis  Starting ice chips  Continue cefepime and flagyl  zofran and phenergan for nausea  Colorectal surgery consulted        S/P admn tPA in diff fac w/n last 24 hr bef adm to crnt fac    -Patient admitted to [facility] for close montioring.        AF (paroxysmal atrial fibrillation)    - Stroke risk factor.  Patient newly diagnosed w/Afib.  Reportedly stopped taking coumadin about 3 weeks ago due to concerns about bleeding  - likely cardioembolic  - rate controlled on atenolol (home med)  - Will need to resume anticoagulation        Hyperlipidemia    -Stroke risk factor.  LDL 58.6.  -Atorvastatin 20 mg daily        Benign essential HTN    -Stroke risk factor  -atenolol and benazepril  -SBP<180.          Carotid arterial disease    -Stroke risk factor.  No hemodynamically significant stenosis on CTA  MP.             4/27: Admitted to [facility] s/p tPA at OSH for L MCA stroke symptoms, likely thromboembolic in origin. CTH and CTA head/neck showed no acute abnormality, no high-grade stenosis or major vessel occlusion  4/28: Aphasia improved overnight. Patient with mild residual expressive. Improvement in right sided strength. HR in 120s. Holding anti-coagulation due to recent tPA  4/29: No acute events. MRI brain showed small subacute left temporal parietal lobe infarct, no mass effect or intracranial hemorrhage. Further improvement in speech. Expressive aphasia has resolved. Right UE/LE strength improved. HR better controlled with atenolol (home med). Can re-start anticoagulation. Stepdown to floor  4/30: CT abdomen pelvis with acute diverticulitis and phlegmon with contained perforation, started on cefepime and flagyl, consulted colorectal surgery  5/1: starting ice chips, seen by colorectal surgery and no intervention needed at this time    STROKE DOCUMENTATION   Acute Stroke Times   Last Known Normal Date: 04/27/18  Last Known Normal Time: 1930  Symptom Onset Date: 04/27/18  Symptom Onset Time: 1930  Stroke Team Called Date: 04/27/18  Stroke Team Called Time: 2020  Stroke Team Arrival Date: 04/27/18  Stroke Team Arrival Time: 2022  CT Interpretation Time: 2030  Decision to Treat Time for Alteplase: 2045  Decision to Treat Time for IR: 0000 (not an IR candidate)    NIH Scale:  1a. Level Of Consciousness: 0-->Alert: keenly responsive  1b. LOC Questions: 0-->Answers both questions correctly  1c. LOC Commands: 0-->Performs both tasks correctly  2. Best Gaze: 0-->Normal  3. Visual: 0-->No visual loss  4. Facial Palsy: 0-->Normal symmetrical movements  5a. Motor Arm, Left: 0-->No drift: limb holds 90 (or 45) degrees for full 10 secs  5b. Motor Arm, Right: 0-->No drift: limb holds 90 (or 45) degrees for full 10 secs  6a. Motor Leg, Left: 0-->No drift: leg holds 30 degree position for full 5 secs  6b. Motor Leg, Right:  0-->No drift: leg holds 30 degree position for full 5 secs  7. Limb Ataxia: 0-->Absent  8. Sensory: 0-->Normal: no sensory loss  9. Best Language: 0-->No aphasia: normal  10. Dysarthria: 0-->Normal  11. Extinction and Inattention (formerly Neglect): 0-->No abnormality  Total (NIH Stroke Scale): 0       Modified Laura Score: 1  King Coma Scale:    ABCD2 Score:    [id number] Score:5  HAS -BLED Score:3  ICH Score:   Hunt & Garber Classification:      Hemorrhagic change of an Ischemic Stroke: Does this patient have an ischemic stroke with hemorrhagic changes? No     Neurologic Chief Complaint: R MCA    Subjective:     Interval History: Patient is seen for follow-up neurological assessment and treatment recommendations:  NAEON, abdominal pain persisting, N/V resolved  HPI, Past Medical, Family, and Social History remains the same as documented in the initial encounter.     Review of Systems   Constitutional: Negative for chills and fever.   HENT: Negative for trouble swallowing.    Eyes: Negative for visual disturbance.   Respiratory: Negative for cough and shortness of breath.    Cardiovascular: Negative for chest pain and palpitations.   Gastrointestinal: Positive for abdominal pain. Negative for diarrhea, nausea and vomiting.   Musculoskeletal: Negative for neck pain and neck stiffness.   Skin: Negative for rash and wound.   Neurological: Negative for dizziness, facial asymmetry, speech difficulty, weakness, numbness and headaches.     Scheduled Meds:   aspirin  81 mg Oral Daily    atenolol  50 mg Oral Daily    atorvastatin  40 mg Oral Daily    benazepril  10 mg Oral Daily    ceFEPime (MAXIPIME) IVPB  2 g Intravenous Q12H    heparin (porcine)  5,000 Units Subcutaneous Q8H    metronidazole  500 mg Intravenous Q8H    sodium chloride 0.9%  3 mL Intravenous Q8H    sodium phosphate IVPB  30 mmol Intravenous Once     Continuous Infusions:    PRN Meds:acetaminophen, dextrose 50%, glucagon (human recombinant),  hydrALAZINE, insulin aspart U-100, labetalol, morphine, ondansetron, polyethylene glycol, promethazine    Objective:     Vital Signs (Most Recent):  Temp: 98.1 °F (36.7 °C) (05/01/18 0022)  Pulse: 103 (05/01/18 0800)  Resp: 18 (05/01/18 0022)  BP: 124/66 (05/01/18 0022)  SpO2: 95 % (05/01/18 0022)  BP Location: Left arm    Vital Signs Range (Last 24H):  Temp:  [98.1 °F (36.7 °C)-99.1 °F (37.3 °C)]   Pulse:  []   Resp:  [16-18]   BP: (124-173)/(66-96)   SpO2:  [91 %-98 %]   BP Location: Left arm    Physical Exam   Constitutional: She is oriented to person, place, and time. She appears well-developed and well-nourished. No distress.   HENT:   Head: Normocephalic and atraumatic.   Eyes: EOM are normal. Pupils are equal, round, and reactive to light.   Cardiovascular: Normal rate and regular rhythm.    Pulmonary/Chest: Effort normal. No respiratory distress. She has no rhonchi.   Abdominal: Soft. She exhibits no distension. There is no hepatosplenomegaly. There is tenderness.   Neurological: She is alert and oriented to person, place, and time.   Skin: Skin is warm and dry.   Vitals reviewed.      Neurological Exam:   LOC: alert  Attention Span: Good   Language: No aphasia  Articulation: No dysarthria  Orientation: Person, Place, Time   Visual Fields: Full  EOM (CN III, IV, VI): Full/intact  Pupils (CN II, III): PERRL  Facial Sensation (CN V): Normal  Facial Movement (CN VII): Symmetric facial expression    Motor: Arm left  Normal 5/5  Leg left  Normal 5/5  Arm right  Normal 5/5  Leg right Normal 5/5  Cebellar: No evidence of appendicular or axial ataxia  Sensation: Intact to light touch, temperature and vibration  Tone: Normal tone throughout    Laboratory:  CMP:     Recent Labs  Lab 05/01/18  0428   CALCIUM 8.2*   ALBUMIN 2.8*   PROT 5.9*   *   K 3.7   CO2 24   CL 99   BUN 7*   CREATININE 0.8   ALKPHOS 48*   ALT 18   AST 41*   BILITOT 0.7     CBC:     Recent Labs  Lab 05/01/18  0428   WBC 11.37  11.07    RBC 4.10  4.14   HGB 11.5*  11.8*   HCT 35.4*  36.4*     297   MCV 86  88   MCH 28.0  28.5   MCHC 32.5  32.4     Coagulation:     Recent Labs  Lab 04/27/18 2328 05/01/18  0428   INR 1.2  < > 1.0   APTT <21.0  --   --    < > = values in this interval not displayed.  TSH:     Recent Labs  Lab 04/27/18 2328   TSH 3.750       Diagnostic Results     Brain Imaging   CTH 4/27  No acute intracranial abnormality. Chronic changes     CTA head/neck 4/27  No acute abnormality. No high-grade stenosis or major vessel occlusion.    MRI brain. Date: 04/28/18  Small subacute left temporal parietal lobe infarct.  No mass effect or intracranial hemorrhage.    Mild scattered white matter disease, most consistent with chronic microvascular ischemic disease.    Vessel Imaging   CTA head/neck 4/27  No acute abnormality. No high-grade stenosis or major vessel occlusion.    Cardiac Imaging   2D echo 4/28    1 - Mildly depressed left ventricular systolic function (EF 45-50%).     2 - Concentric remodeling.     3 - Wall motion abnormalities.     4 - Low normal right ventricular systolic function .     5 - Mild to moderate mitral regurgitation.     6 - Trivial tricuspid regurgitation.     7 - The estimated PA systolic pressure is 28 mmHg.               Ashley Phoenix PA-C  Comprehensive Stroke Center  Department of Vascular Neurology   [facility]

## 2018-05-01 NOTE — PROGRESS NOTES
[facility]  Colorectal Surgery  Progress Note    Patient Name: Lyn Rapp  MRN: [id number]  Admission Date: 4/27/2018  Hospital Length of Stay: 4 days  Attending Physician: Bello Alex MD    Subjective:     Interval History: NAEON.  Mild lower abdominal pain.  Labs and vitals wnl.    Post-Op Info:  * No surgery found *          Medications:  Continuous Infusions:  Scheduled Meds:   aspirin  81 mg Oral Daily    atenolol  50 mg Oral Daily    atorvastatin  40 mg Oral Daily    benazepril  10 mg Oral Daily    ceFEPime (MAXIPIME) IVPB  2 g Intravenous Q12H    heparin (porcine)  5,000 Units Subcutaneous Q8H    metronidazole  500 mg Intravenous Q8H    sodium chloride 0.9%  500 mL Intravenous Once    sodium chloride 0.9%  3 mL Intravenous Q8H    sodium phosphate IVPB  30 mmol Intravenous Once     PRN Meds:   acetaminophen    dextrose 50%    glucagon (human recombinant)    hydrALAZINE    insulin aspart U-100    labetalol    morphine    ondansetron    polyethylene glycol    promethazine        Objective:     Vital Signs (Most Recent):  Temp: 98.1 °F (36.7 °C) (05/01/18 0022)  Pulse: 103 (05/01/18 0800)  Resp: 18 (05/01/18 0022)  BP: 124/66 (05/01/18 0022)  SpO2: 95 % (05/01/18 0022) Vital Signs (24h Range):  Temp:  [98.1 °F (36.7 °C)-99.1 °F (37.3 °C)] 98.1 °F (36.7 °C)  Pulse:  [] 103  Resp:  [16-18] 18  SpO2:  [91 %-98 %] 95 %  BP: (124-173)/(66-96) 124/66     Intake/Output - Last 3 Shifts       04/29 0700 - 04/30 0659 04/30 0700 - 05/01 0659 05/01 0700 - 05/02 0659    I.V. (mL/kg) 121.3 (1.8)      Total Intake(mL/kg) 121.3 (1.8)      Net +121.3               Urine Occurrence 2 x      Stool Occurrence 2 x      Emesis Occurrence 1 x            Physical Exam  NAD, AAOx3  RRR  CTAB  Abd S/ND/Mild LLQ TTP      Significant Labs:  BMP:   Recent Labs  Lab 05/01/18  0428   *   *   K 3.7   CL 99   CO2 24   BUN 7*   CREATININE 0.8   CALCIUM 8.2*   MG 1.7     CBC:    Recent Labs  Lab 05/01/18  0428   WBC 11.37  11.07   RBC 4.10  4.14   HGB 11.5*  11.8*   HCT 35.4*  36.4*     297   MCV 86  88   MCH 28.0  28.5   MCHC 32.5  32.4           Assessment/Plan:     Diverticulitis    73 year old female with new onset of aphasia which has improved, now with abd pain, Ct pos for diverticulitis with small perforation    No drainable fluid collection  Continue IV abx  OK for ice chips  Will continue to follow              Dionicio Rolon MD  Colorectal Surgery  [facility]    Have seen and examined the patient with the fellow and agree with their plan.  DEB DOW

## 2018-05-01 NOTE — SUBJECTIVE & OBJECTIVE
Subjective:     Interval History: NAEON.  Mild lower abdominal pain.  Labs and vitals wnl.    Post-Op Info:  * No surgery found *          Medications:  Continuous Infusions:  Scheduled Meds:   aspirin  81 mg Oral Daily    atenolol  50 mg Oral Daily    atorvastatin  40 mg Oral Daily    benazepril  10 mg Oral Daily    ceFEPime (MAXIPIME) IVPB  2 g Intravenous Q12H    heparin (porcine)  5,000 Units Subcutaneous Q8H    metronidazole  500 mg Intravenous Q8H    sodium chloride 0.9%  500 mL Intravenous Once    sodium chloride 0.9%  3 mL Intravenous Q8H    sodium phosphate IVPB  30 mmol Intravenous Once     PRN Meds:   acetaminophen    dextrose 50%    glucagon (human recombinant)    hydrALAZINE    insulin aspart U-100    labetalol    morphine    ondansetron    polyethylene glycol    promethazine        Objective:     Vital Signs (Most Recent):  Temp: 98.1 °F (36.7 °C) (05/01/18 0022)  Pulse: 103 (05/01/18 0800)  Resp: 18 (05/01/18 0022)  BP: 124/66 (05/01/18 0022)  SpO2: 95 % (05/01/18 0022) Vital Signs (24h Range):  Temp:  [98.1 °F (36.7 °C)-99.1 °F (37.3 °C)] 98.1 °F (36.7 °C)  Pulse:  [] 103  Resp:  [16-18] 18  SpO2:  [91 %-98 %] 95 %  BP: (124-173)/(66-96) 124/66     Intake/Output - Last 3 Shifts       04/29 0700 - 04/30 0659 04/30 0700 - 05/01 0659 05/01 0700 - 05/02 0659    I.V. (mL/kg) 121.3 (1.8)      Total Intake(mL/kg) 121.3 (1.8)      Net +121.3               Urine Occurrence 2 x      Stool Occurrence 2 x      Emesis Occurrence 1 x            Physical Exam  NAD, AAOx3  RRR  CTAB  Abd S/ND/Mild LLQ TTP      Significant Labs:  BMP:   Recent Labs  Lab 05/01/18 0428   *   *   K 3.7   CL 99   CO2 24   BUN 7*   CREATININE 0.8   CALCIUM 8.2*   MG 1.7     CBC:   Recent Labs  Lab 05/01/18 0428   WBC 11.37  11.07   RBC 4.10  4.14   HGB 11.5*  11.8*   HCT 35.4*  36.4*     297   MCV 86  88   MCH 28.0  28.5   MCHC 32.5  32.4

## 2018-05-02 VITALS
SYSTOLIC BLOOD PRESSURE: 142 MMHG | OXYGEN SATURATION: 98 % | HEART RATE: 117 BPM | DIASTOLIC BLOOD PRESSURE: 75 MMHG | BODY MASS INDEX: 24.38 KG/M2 | RESPIRATION RATE: 18 BRPM | WEIGHT: 151.69 LBS | TEMPERATURE: 98 F | HEIGHT: 66 IN

## 2018-05-02 PROBLEM — R93.89 ABNORMAL FINDING ON CT SCAN: Status: ACTIVE | Noted: 2018-05-02

## 2018-05-02 PROBLEM — G93.6 CYTOTOXIC CEREBRAL EDEMA: Status: ACTIVE | Noted: 2018-05-02

## 2018-05-02 LAB
ALBUMIN SERPL BCP-MCNC: 2.5 G/DL
ALP SERPL-CCNC: 44 U/L
ALT SERPL W/O P-5'-P-CCNC: 16 U/L
ANION GAP SERPL CALC-SCNC: 8 MMOL/L
AST SERPL-CCNC: 44 U/L
BASOPHILS # BLD AUTO: 0.05 K/UL
BASOPHILS NFR BLD: 0.5 %
BILIRUB SERPL-MCNC: 0.8 MG/DL
BUN SERPL-MCNC: 8 MG/DL
CALCIUM SERPL-MCNC: 8.8 MG/DL
CHLORIDE SERPL-SCNC: 105 MMOL/L
CO2 SERPL-SCNC: 25 MMOL/L
CREAT SERPL-MCNC: 0.8 MG/DL
DIFFERENTIAL METHOD: ABNORMAL
EOSINOPHIL # BLD AUTO: 0.1 K/UL
EOSINOPHIL NFR BLD: 0.5 %
ERYTHROCYTE [DISTWIDTH] IN BLOOD BY AUTOMATED COUNT: 14.6 %
EST. GFR  (AFRICAN AMERICAN): >60 ML/MIN/1.73 M^2
EST. GFR  (NON AFRICAN AMERICAN): >60 ML/MIN/1.73 M^2
GLUCOSE SERPL-MCNC: 95 MG/DL
HCT VFR BLD AUTO: 36.5 %
HGB BLD-MCNC: 11.9 G/DL
IMM GRANULOCYTES # BLD AUTO: 0.04 K/UL
IMM GRANULOCYTES NFR BLD AUTO: 0.4 %
INR PPP: 1.1
LYMPHOCYTES # BLD AUTO: 1.3 K/UL
LYMPHOCYTES NFR BLD: 13.9 %
MAGNESIUM SERPL-MCNC: 1.6 MG/DL
MCH RBC QN AUTO: 28 PG
MCHC RBC AUTO-ENTMCNC: 32.6 G/DL
MCV RBC AUTO: 86 FL
MONOCYTES # BLD AUTO: 0.8 K/UL
MONOCYTES NFR BLD: 8.5 %
NEUTROPHILS # BLD AUTO: 7.4 K/UL
NEUTROPHILS NFR BLD: 76.2 %
NRBC BLD-RTO: 0 /100 WBC
PHOSPHATE SERPL-MCNC: 2 MG/DL
PLATELET # BLD AUTO: 295 K/UL
PMV BLD AUTO: 10.4 FL
POTASSIUM SERPL-SCNC: 3.8 MMOL/L
PROT SERPL-MCNC: 5.6 G/DL
PROTHROMBIN TIME: 11.6 SEC
RBC # BLD AUTO: 4.25 M/UL
SODIUM SERPL-SCNC: 138 MMOL/L
WBC # BLD AUTO: 9.65 K/UL

## 2018-05-02 PROCEDURE — 87449 NOS EACH ORGANISM AG IA: CPT

## 2018-05-02 PROCEDURE — 25000003 PHARM REV CODE 250: Performed by: STUDENT IN AN ORGANIZED HEALTH CARE EDUCATION/TRAINING PROGRAM

## 2018-05-02 PROCEDURE — 85610 PROTHROMBIN TIME: CPT

## 2018-05-02 PROCEDURE — 36415 COLL VENOUS BLD VENIPUNCTURE: CPT

## 2018-05-02 PROCEDURE — 85025 COMPLETE CBC W/AUTO DIFF WBC: CPT

## 2018-05-02 PROCEDURE — 25000003 PHARM REV CODE 250: Performed by: PHYSICIAN ASSISTANT

## 2018-05-02 PROCEDURE — 63600175 PHARM REV CODE 636 W HCPCS: Performed by: PHYSICIAN ASSISTANT

## 2018-05-02 PROCEDURE — A4216 STERILE WATER/SALINE, 10 ML: HCPCS | Performed by: STUDENT IN AN ORGANIZED HEALTH CARE EDUCATION/TRAINING PROGRAM

## 2018-05-02 PROCEDURE — 99233 SBSQ HOSP IP/OBS HIGH 50: CPT | Mod: GC,,, | Performed by: PSYCHIATRY & NEUROLOGY

## 2018-05-02 PROCEDURE — 25000003 PHARM REV CODE 250: Performed by: NURSE PRACTITIONER

## 2018-05-02 PROCEDURE — 80053 COMPREHEN METABOLIC PANEL: CPT

## 2018-05-02 PROCEDURE — 84100 ASSAY OF PHOSPHORUS: CPT

## 2018-05-02 PROCEDURE — S0030 INJECTION, METRONIDAZOLE: HCPCS | Performed by: NURSE PRACTITIONER

## 2018-05-02 PROCEDURE — 63600175 PHARM REV CODE 636 W HCPCS: Performed by: NURSE PRACTITIONER

## 2018-05-02 PROCEDURE — 83735 ASSAY OF MAGNESIUM: CPT

## 2018-05-02 RX ORDER — METRONIDAZOLE 500 MG/1
500 TABLET ORAL EVERY 8 HOURS
Qty: 22 TABLET | Refills: 0 | Status: SHIPPED | OUTPATIENT
Start: 2018-05-02 | End: 2018-05-16

## 2018-05-02 RX ORDER — SODIUM,POTASSIUM PHOSPHATES 280-250MG
2 POWDER IN PACKET (EA) ORAL ONCE
Status: COMPLETED | OUTPATIENT
Start: 2018-05-02 | End: 2018-05-02

## 2018-05-02 RX ORDER — ATENOLOL 25 MG/1
50 TABLET ORAL DAILY
Qty: 30 TABLET | Refills: 5 | Status: SHIPPED | OUTPATIENT
Start: 2018-05-02 | End: 2018-05-02

## 2018-05-02 RX ORDER — ATENOLOL 25 MG/1
25 TABLET ORAL DAILY
Qty: 30 TABLET | Refills: 5 | Status: ON HOLD | OUTPATIENT
Start: 2018-05-02 | End: 2018-05-29 | Stop reason: HOSPADM

## 2018-05-02 RX ORDER — WARFARIN 2 MG/1
4 TABLET ORAL DAILY
Status: DISCONTINUED | OUTPATIENT
Start: 2018-05-02 | End: 2018-05-02

## 2018-05-02 RX ORDER — WARFARIN SODIUM 5 MG/1
5 TABLET ORAL DAILY
Status: DISCONTINUED | OUTPATIENT
Start: 2018-05-02 | End: 2018-05-02 | Stop reason: HOSPADM

## 2018-05-02 RX ORDER — METRONIDAZOLE 500 MG/1
500 TABLET ORAL EVERY 8 HOURS
Status: DISCONTINUED | OUTPATIENT
Start: 2018-05-02 | End: 2018-05-02 | Stop reason: HOSPADM

## 2018-05-02 RX ORDER — CEFDINIR 125 MG/5ML
300 POWDER, FOR SUSPENSION ORAL EVERY 12 HOURS
Status: DISCONTINUED | OUTPATIENT
Start: 2018-05-02 | End: 2018-05-02 | Stop reason: HOSPADM

## 2018-05-02 RX ADMIN — HEPARIN SODIUM 5000 UNITS: 5000 INJECTION, SOLUTION INTRAVENOUS; SUBCUTANEOUS at 05:05

## 2018-05-02 RX ADMIN — METRONIDAZOLE 500 MG: 500 INJECTION, SOLUTION INTRAVENOUS at 01:05

## 2018-05-02 RX ADMIN — ATENOLOL 50 MG: 50 TABLET ORAL at 10:05

## 2018-05-02 RX ADMIN — Medication 3 ML: at 01:05

## 2018-05-02 RX ADMIN — METRONIDAZOLE 500 MG: 500 INJECTION, SOLUTION INTRAVENOUS at 05:05

## 2018-05-02 RX ADMIN — HEPARIN SODIUM 5000 UNITS: 5000 INJECTION, SOLUTION INTRAVENOUS; SUBCUTANEOUS at 01:05

## 2018-05-02 RX ADMIN — CEFEPIME HYDROCHLORIDE 2 G: 2 INJECTION, POWDER, FOR SOLUTION INTRAVENOUS at 05:05

## 2018-05-02 RX ADMIN — POTASSIUM & SODIUM PHOSPHATES POWDER PACK 280-160-250 MG 2 PACKET: 280-160-250 PACK at 10:05

## 2018-05-02 RX ADMIN — BENAZEPRIL HYDROCHLORIDE 10 MG: 10 TABLET, FILM COATED ORAL at 10:05

## 2018-05-02 RX ADMIN — Medication 3 ML: at 06:05

## 2018-05-02 NOTE — PROGRESS NOTES
[facility]  Vascular Neurology  Comprehensive Stroke Center  Progress Note    Assessment/Plan:     * Embolic stroke involving left middle cerebral artery    73 y.o. female with significant past medical history of GERD, HTN, CAD, Afib, and diverticulitis presented to hospital as a transfer from [facility] ED for evaluation of L MCA stroke symptoms.  Patient received tPA.  CTA MP negative for LVO. Source is likely cardioembolic as patient has A Fib and stopped taking coumadin about 3 weeks ago.    MRI brain shows small subacute left temporal parietal lobe infarct.  No mass effect or intracranial hemorrhage.     Antithrombotics for secondary stroke prevention: Anticoagulants: Warfarin INR adjusted target    Statins for secondary stroke prevention and hyperlipidemia, if present:   Statins: None: Reason: LDL <70    Aggressive risk factor modification: HTN, A-Fib     Rehab efforts: PT/OT/SLP to evaluate and treat    Diagnostics ordered/pending: none    VTE prophylaxis: Heparin 5000 units SQ every 8 hours    BP parameters: Infarct: Post tPA, SBP <180            Abnormal finding on CT scan    CT abdomen pelvis with contrast revealed segmental decreased enhancement at the interpolar region of the right kidney likely representing pyelonephritis  Patient's UA negative  Denies dysuria and flank pain  Currently on antibiotics for diverticulitis         Diverticulitis    Evident on CT abdomen/pelvis  Tolerated a clear liquid diet, starting regular diet  Continue cefepime and flagyl  zofran and phenergan for nausea  Colorectal surgery consulted        S/P admn tPA in diff fac w/n last 24 hr bef adm to crnt fac    -Patient admitted to [facility] for close montioring.        AF (paroxysmal atrial fibrillation)    - Stroke risk factor.  Patient newly diagnosed w/Afib.  Reportedly stopped taking coumadin about 3 weeks ago due to concerns about bleeding  - likely cardioembolic  - rate controlled on  atenolol (home med)  - restarting coumadin on 05/02/18        Benign essential HTN    -Stroke risk factor  -atenolol and benazepril  -SBP<180.          Carotid arterial disease    -Stroke risk factor.  No hemodynamically significant stenosis on CTA MP.             4/27: Admitted to [facility] s/p tPA at OSH for L MCA stroke symptoms, likely thromboembolic in origin. CTH and CTA head/neck showed no acute abnormality, no high-grade stenosis or major vessel occlusion  4/28: Aphasia improved overnight. Patient with mild residual expressive. Improvement in right sided strength. HR in 120s. Holding anti-coagulation due to recent tPA  4/29: No acute events. MRI brain showed small subacute left temporal parietal lobe infarct, no mass effect or intracranial hemorrhage. Further improvement in speech. Expressive aphasia has resolved. Right UE/LE strength improved. HR better controlled with atenolol (home med). Can re-start anticoagulation. Stepdown to floor  4/30: CT abdomen pelvis with acute diverticulitis and phlegmon with contained perforation, started on cefepime and flagyl, consulted colorectal surgery  5/1: starting clear liquid diet, seen by colorectal surgery and no intervention needed at this time  5/2: tolerated a clear liquid diet overnight, starting regular diet    STROKE DOCUMENTATION   Acute Stroke Times   Last Known Normal Date: 04/27/18  Last Known Normal Time: 1930  Symptom Onset Date: 04/27/18  Symptom Onset Time: 1930  Stroke Team Called Date: 04/27/18  Stroke Team Called Time: 2020  Stroke Team Arrival Date: 04/27/18  Stroke Team Arrival Time: 2022  CT Interpretation Time: 2030  Decision to Treat Time for Alteplase: 2045  Decision to Treat Time for IR: 0000 (not an IR candidate)    NIH Scale:  1a. Level Of Consciousness: 0-->Alert: keenly responsive  1b. LOC Questions: 0-->Answers both questions correctly  1c. LOC Commands: 0-->Performs both tasks correctly  2. Best Gaze: 0-->Normal  3. Visual: 0-->No visual  loss  4. Facial Palsy: 0-->Normal symmetrical movements  5a. Motor Arm, Left: 0-->No drift: limb holds 90 (or 45) degrees for full 10 secs  5b. Motor Arm, Right: 0-->No drift: limb holds 90 (or 45) degrees for full 10 secs  6a. Motor Leg, Left: 0-->No drift: leg holds 30 degree position for full 5 secs  6b. Motor Leg, Right: 0-->No drift: leg holds 30 degree position for full 5 secs  7. Limb Ataxia: 0-->Absent  8. Sensory: 0-->Normal: no sensory loss  9. Best Language: 0-->No aphasia: normal  10. Dysarthria: 0-->Normal  11. Extinction and Inattention (formerly Neglect): 0-->No abnormality  Total (NIH Stroke Scale): 0       Modified [place] Score: 1  King Coma Scale:    ABCD2 Score:    [id number] Score:5  HAS -BLED Score:3  ICH Score:   Hunt & Garber Classification:      Hemorrhagic change of an Ischemic Stroke: Does this patient have an ischemic stroke with hemorrhagic changes? No     Neurologic Chief Complaint: R MCA    Subjective:     Interval History: Patient is seen for follow-up neurological assessment and treatment recommendations:  NAEON, abdominal pain persisting, N/V resolved, continued diarrhea, denies bloody stool  HPI, Past Medical, Family, and Social History remains the same as documented in the initial encounter.     Review of Systems   Constitutional: Negative for chills and fever.        Night sweats   HENT: Negative for trouble swallowing.    Eyes: Negative for visual disturbance.   Respiratory: Negative for cough and shortness of breath.    Cardiovascular: Negative for chest pain and palpitations.   Gastrointestinal: Positive for abdominal pain and diarrhea. Negative for blood in stool, nausea and vomiting.   Genitourinary: Negative for dysuria and flank pain.   Musculoskeletal: Negative for neck pain and neck stiffness.   Skin: Negative for rash and wound.   Neurological: Negative for dizziness, facial asymmetry, speech difficulty, weakness, numbness and headaches.     Scheduled Meds:   aspirin   81 mg Oral Daily    atenolol  50 mg Oral Daily    atorvastatin  40 mg Oral Daily    benazepril  10 mg Oral Daily    ceFEPime (MAXIPIME) IVPB  2 g Intravenous Q12H    heparin (porcine)  5,000 Units Subcutaneous Q8H    metronidazole  500 mg Intravenous Q8H    potassium, sodium phosphates  2 packet Oral Once    sodium chloride 0.9%  3 mL Intravenous Q8H     Continuous Infusions:    PRN Meds:acetaminophen, dextrose 50%, glucagon (human recombinant), hydrALAZINE, insulin aspart U-100, labetalol, morphine, ondansetron, polyethylene glycol, promethazine    Objective:     Vital Signs (Most Recent):  Temp: 98.5 °F (36.9 °C) (05/02/18 0516)  Pulse: 86 (05/02/18 0719)  Resp: 18 (05/02/18 0516)  BP: (!) 155/86 (05/02/18 0516)  SpO2: (!) 92 % (05/02/18 0516)  BP Location: Left arm    Vital Signs Range (Last 24H):  Temp:  [98.5 °F (36.9 °C)-99.6 °F (37.6 °C)]   Pulse:  [66-91]   Resp:  [16-18]   BP: (135-155)/(67-86)   SpO2:  [91 %-97 %]   BP Location: Left arm    Physical Exam   Constitutional: She is oriented to person, place, and time. She appears well-developed and well-nourished. No distress.   HENT:   Head: Normocephalic and atraumatic.   Eyes: EOM are normal. Pupils are equal, round, and reactive to light.   Cardiovascular: Normal rate and regular rhythm.    Pulmonary/Chest: Effort normal. No respiratory distress. She has no rhonchi.   Abdominal: Soft. She exhibits no distension. There is no hepatosplenomegaly. There is tenderness.   Neurological: She is alert and oriented to person, place, and time.   Skin: Skin is warm and dry.   Vitals reviewed.      Neurological Exam:   LOC: alert  Attention Span: Good   Language: No aphasia  Articulation: No dysarthria  Orientation: Person, Place, Time   Visual Fields: Full  EOM (CN III, IV, VI): Full/intact  Pupils (CN II, III): PERRL  Facial Sensation (CN V): Normal  Facial Movement (CN VII): Symmetric facial expression    Motor: Arm left  Normal 5/5  Leg left  Normal  5/5  Arm right  Normal 5/5  Leg right Normal 5/5  Cebellar: No evidence of appendicular or axial ataxia  Sensation: Intact to light touch, temperature and vibration  Tone: Normal tone throughout    Laboratory:  CMP:     Recent Labs  Lab 05/02/18 0424   CALCIUM 8.8   ALBUMIN 2.5*   PROT 5.6*      K 3.8   CO2 25      BUN 8   CREATININE 0.8   ALKPHOS 44*   ALT 16   AST 44*   BILITOT 0.8     CBC:     Recent Labs  Lab 05/02/18 0424   WBC 9.65   RBC 4.25   HGB 11.9*   HCT 36.5*      MCV 86   MCH 28.0   MCHC 32.6     Coagulation:     Recent Labs  Lab 04/27/18 2328 05/02/18 0424   INR 1.2  < > 1.1   APTT <21.0  --   --    < > = values in this interval not displayed.  TSH:     Recent Labs  Lab 04/27/18 2328   TSH 3.750       Diagnostic Results     Brain Imaging   CTH 4/27  No acute intracranial abnormality. Chronic changes     CTA head/neck 4/27  No acute abnormality. No high-grade stenosis or major vessel occlusion.    MRI brain. Date: 04/28/18  Small subacute left temporal parietal lobe infarct.  No mass effect or intracranial hemorrhage.    Mild scattered white matter disease, most consistent with chronic microvascular ischemic disease.    Vessel Imaging   CTA head/neck 4/27  No acute abnormality. No high-grade stenosis or major vessel occlusion.    Cardiac Imaging   2D echo 4/28    1 - Mildly depressed left ventricular systolic function (EF 45-50%).     2 - Concentric remodeling.     3 - Wall motion abnormalities.     4 - Low normal right ventricular systolic function .     5 - Mild to moderate mitral regurgitation.     6 - Trivial tricuspid regurgitation.     7 - The estimated PA systolic pressure is 28 mmHg.               Ashley Phoenix PA-C  Comprehensive Stroke Center  Department of Vascular Neurology   [facility]

## 2018-05-02 NOTE — ASSESSMENT & PLAN NOTE
Contributing Nutrition Diagnosis  Inadequate energy intake    Related to (etiology):   Decreased ability to consume sufficient energy    Signs and Symptoms (as evidenced by):   NPO with no alternative means of nutrition at this time     Nutrition Diagnosis Status:   Resolved

## 2018-05-02 NOTE — SUBJECTIVE & OBJECTIVE
Subjective:     Interval History: NAEON.  Mild lower abdominal pain, stable.  Labs and vitals wnl.    Post-Op Info:  * No surgery found *          Medications:  Continuous Infusions:  Scheduled Meds:   aspirin  81 mg Oral Daily    atenolol  50 mg Oral Daily    atorvastatin  40 mg Oral Daily    benazepril  10 mg Oral Daily    ceFEPime (MAXIPIME) IVPB  2 g Intravenous Q12H    heparin (porcine)  5,000 Units Subcutaneous Q8H    metronidazole  500 mg Intravenous Q8H    potassium, sodium phosphates  2 packet Oral Once    sodium chloride 0.9%  3 mL Intravenous Q8H     PRN Meds:   acetaminophen    dextrose 50%    glucagon (human recombinant)    hydrALAZINE    insulin aspart U-100    labetalol    morphine    ondansetron    polyethylene glycol    promethazine        Objective:     Vital Signs (Most Recent):  Temp: 98.5 °F (36.9 °C) (05/02/18 0516)  Pulse: 86 (05/02/18 0719)  Resp: 18 (05/02/18 0516)  BP: (!) 155/86 (05/02/18 0516)  SpO2: (!) 92 % (05/02/18 0516) Vital Signs (24h Range):  Temp:  [98.5 °F (36.9 °C)-99.6 °F (37.6 °C)] 98.5 °F (36.9 °C)  Pulse:  [66-91] 86  Resp:  [16-18] 18  SpO2:  [91 %-97 %] 92 %  BP: (135-155)/(67-86) 155/86     Intake/Output - Last 3 Shifts     None          Physical Exam    NAD, AAOx3  RRR  CTAB  Abd S/ND/Mild LLQ TTP      Significant Labs:  BMP:     Recent Labs  Lab 05/02/18  0424   GLU 95      K 3.8      CO2 25   BUN 8   CREATININE 0.8   CALCIUM 8.8   MG 1.6     CBC:     Recent Labs  Lab 05/02/18  0424   WBC 9.65   RBC 4.25   HGB 11.9*   HCT 36.5*      MCV 86   MCH 28.0   MCHC 32.6

## 2018-05-02 NOTE — ASSESSMENT & PLAN NOTE
- Stroke risk factor.  Patient newly diagnosed w/Afib.  Reportedly stopped taking coumadin about 3 weeks ago due to concerns about bleeding  - restarting coumadin on 05/02/18

## 2018-05-02 NOTE — SUBJECTIVE & OBJECTIVE
Neurologic Chief Complaint: R MCA    Subjective:     Interval History: Patient is seen for follow-up neurological assessment and treatment recommendations:  NAEON, abdominal pain persisting, N/V resolved, continued diarrhea, denies bloody stool  HPI, Past Medical, Family, and Social History remains the same as documented in the initial encounter.     Review of Systems   Constitutional: Negative for chills and fever.        Night sweats   HENT: Negative for trouble swallowing.    Eyes: Negative for visual disturbance.   Respiratory: Negative for cough and shortness of breath.    Cardiovascular: Negative for chest pain and palpitations.   Gastrointestinal: Positive for abdominal pain and diarrhea. Negative for blood in stool, nausea and vomiting.   Genitourinary: Negative for dysuria and flank pain.   Musculoskeletal: Negative for neck pain and neck stiffness.   Skin: Negative for rash and wound.   Neurological: Negative for dizziness, facial asymmetry, speech difficulty, weakness, numbness and headaches.     Scheduled Meds:   aspirin  81 mg Oral Daily    atenolol  50 mg Oral Daily    atorvastatin  40 mg Oral Daily    benazepril  10 mg Oral Daily    ceFEPime (MAXIPIME) IVPB  2 g Intravenous Q12H    heparin (porcine)  5,000 Units Subcutaneous Q8H    metronidazole  500 mg Intravenous Q8H    potassium, sodium phosphates  2 packet Oral Once    sodium chloride 0.9%  3 mL Intravenous Q8H     Continuous Infusions:    PRN Meds:acetaminophen, dextrose 50%, glucagon (human recombinant), hydrALAZINE, insulin aspart U-100, labetalol, morphine, ondansetron, polyethylene glycol, promethazine    Objective:     Vital Signs (Most Recent):  Temp: 98.5 °F (36.9 °C) (05/02/18 0516)  Pulse: 86 (05/02/18 0719)  Resp: 18 (05/02/18 0516)  BP: (!) 155/86 (05/02/18 0516)  SpO2: (!) 92 % (05/02/18 0516)  BP Location: Left arm    Vital Signs Range (Last 24H):  Temp:  [98.5 °F (36.9 °C)-99.6 °F (37.6 °C)]   Pulse:  [66-91]   Resp:   [16-18]   BP: (135-155)/(67-86)   SpO2:  [91 %-97 %]   BP Location: Left arm    Physical Exam   Constitutional: She is oriented to person, place, and time. She appears well-developed and well-nourished. No distress.   HENT:   Head: Normocephalic and atraumatic.   Eyes: EOM are normal. Pupils are equal, round, and reactive to light.   Cardiovascular: Normal rate and regular rhythm.    Pulmonary/Chest: Effort normal. No respiratory distress. She has no rhonchi.   Abdominal: Soft. She exhibits no distension. There is no hepatosplenomegaly. There is tenderness.   Neurological: She is alert and oriented to person, place, and time.   Skin: Skin is warm and dry.   Vitals reviewed.      Neurological Exam:   LOC: alert  Attention Span: Good   Language: No aphasia  Articulation: No dysarthria  Orientation: Person, Place, Time   Visual Fields: Full  EOM (CN III, IV, VI): Full/intact  Pupils (CN II, III): PERRL  Facial Sensation (CN V): Normal  Facial Movement (CN VII): Symmetric facial expression    Motor: Arm left  Normal 5/5  Leg left  Normal 5/5  Arm right  Normal 5/5  Leg right Normal 5/5  Cebellar: No evidence of appendicular or axial ataxia  Sensation: Intact to light touch, temperature and vibration  Tone: Normal tone throughout    Laboratory:  CMP:     Recent Labs  Lab 05/02/18 0424   CALCIUM 8.8   ALBUMIN 2.5*   PROT 5.6*      K 3.8   CO2 25      BUN 8   CREATININE 0.8   ALKPHOS 44*   ALT 16   AST 44*   BILITOT 0.8     CBC:     Recent Labs  Lab 05/02/18 0424   WBC 9.65   RBC 4.25   HGB 11.9*   HCT 36.5*      MCV 86   MCH 28.0   MCHC 32.6     Coagulation:     Recent Labs  Lab 04/27/18 2328 05/02/18 0424   INR 1.2  < > 1.1   APTT <21.0  --   --    < > = values in this interval not displayed.  TSH:     Recent Labs  Lab 04/27/18 2328   TSH 3.750       Diagnostic Results     Brain Imaging   CTH 4/27  No acute intracranial abnormality. Chronic changes     CTA head/neck 4/27  No acute abnormality. No  high-grade stenosis or major vessel occlusion.    MRI brain. Date: 04/28/18  Small subacute left temporal parietal lobe infarct.  No mass effect or intracranial hemorrhage.    Mild scattered white matter disease, most consistent with chronic microvascular ischemic disease.    Vessel Imaging   CTA head/neck 4/27  No acute abnormality. No high-grade stenosis or major vessel occlusion.    Cardiac Imaging   2D echo 4/28    1 - Mildly depressed left ventricular systolic function (EF 45-50%).     2 - Concentric remodeling.     3 - Wall motion abnormalities.     4 - Low normal right ventricular systolic function .     5 - Mild to moderate mitral regurgitation.     6 - Trivial tricuspid regurgitation.     7 - The estimated PA systolic pressure is 28 mmHg.

## 2018-05-02 NOTE — ASSESSMENT & PLAN NOTE
- Stroke risk factor.  Patient newly diagnosed w/Afib.  Reportedly stopped taking coumadin about 3 weeks ago due to concerns about bleeding  - likely cardioembolic  - rate controlled on atenolol (home med)  - restarting coumadin on 05/02/18

## 2018-05-02 NOTE — PROGRESS NOTES
" [facility]  Adult Nutrition  Progress Note    SUMMARY       Recommendations    1. Rec Low Fiber/ Residue, Cardiac diet.   2. If meal intake consistently <50%, order Boost Plus with meals.     RD to monitor.     Goals: Meet % EEN/EPN from meals  Nutrition Goal Status: new  Communication of RD Recs:  (POC)    Reason for Assessment    Reason for Assessment: RD follow-up  Diagnosis: stroke/CVA  Relevant Medical History: HTN, CAD, diverticulitis     General Information Comments: Pt upgraded from clear liquids to regular diet this am, tolerating.     Nutrition Discharge Planning: Low Fiber, Cardiac diet    Nutrition Risk Screen    Nutrition Risk Screen: no indicators present    Nutrition/Diet History    Do you have any cultural, spiritual, Evangelical conflicts, given your current situation?: none stated   Food Allergies: NKFA  Factors Affecting Nutritional Intake: None identified at this time    Anthropometrics    Temp: 98.5 °F (36.9 °C)  Height Method: Stated  Height: 5' 6" (167.6 cm)  Height (inches): 66 in  Weight Method: Bed Scale  Weight: 68.8 kg (151 lb 10.8 oz)  Weight (lb): 151.68 lb  Ideal Body Weight (IBW), Female: 130 lb  % Ideal Body Weight, Female (lb): 116.68 lb  BMI (Calculated): 24.5  BMI Grade: 18.5-24.9 - normal       Lab/Procedures/Meds    Pertinent Labs Reviewed: reviewed  Pertinent Labs Comments: Phos 2.0, Alb 2.5, AST 44  Pertinent Medications Reviewed: reviewed  Pertinent Medications Comments: statin, heparin, K Na Phos     Physical Findings/Assessment    Overall Physical Appearance: nourished  Oral/Mouth Cavity: WDL  Skin: intact    Estimated/Assessed Needs    Weight Used For Calorie Calculations: 68.8 kg (151 lb 10.8 oz)  Energy Calorie Requirements (kcal): 1505  Energy Need Method: [place]-St Jeor (x .125)  Protein Requirements: 72-87g (1.0-1.2g.kg)  Weight Used For Protein Calculations: 72.6 kg (160 lb 0.9 oz)  Fluid Requirements (mL): 1 mL/kcal or per MD  Fluid Need " Method: RDA Method  RDA Method (mL): 1505      Nutrition Prescription Ordered    Current Diet Order: Regular     Evaluation of Received Nutrient/Fluid Intake    % Intake of Estimated Energy Needs: 75 - 100 %  % Meal Intake: 75 - 100 %    Nutrition Risk    Level of Risk/Frequency of Follow-up:  (f/u 1x week)     Assessment and Plan    * Embolic stroke involving left middle cerebral artery    Contributing Nutrition Diagnosis  Inadequate energy intake    Related to (etiology):   Decreased ability to consume sufficient energy    Signs and Symptoms (as evidenced by):   NPO with no alternative means of nutrition at this time     Nutrition Diagnosis Status:   Resolved               Monitor and Evaluation    Food and Nutrient Intake: energy intake, food and beverage intake  Food and Nutrient Adminstration: diet order  Physical Activity and Function: nutrition-related ADLs and IADLs  Anthropometric Measurements: weight, weight change  Biochemical Data, Medical Tests and Procedures: electrolyte and renal panel, gastrointestinal profile, inflammatory profile  Nutrition-Focused Physical Findings: overall appearance     Nutrition Follow-Up    RD Follow-up?: Yes

## 2018-05-02 NOTE — ASSESSMENT & PLAN NOTE
73 y.o. female with significant past medical history of GERD, HTN, CAD, Afib, and diverticulitis presented to hospital as a transfer from [facility] ED for evaluation of L MCA stroke symptoms.  Patient received tPA.  CTA MP negative for LVO. Source is likely cardioembolic as patient has A Fib and stopped taking coumadin about 3 weeks ago.    MRI brain shows small subacute left temporal parietal lobe infarct.  No mass effect or intracranial hemorrhage.     Antithrombotics for secondary stroke prevention: Anticoagulants: Warfarin INR adjusted target    Statins for secondary stroke prevention and hyperlipidemia, if present:   Statins: None: Reason: LDL <70    Aggressive risk factor modification: HTN, A-Fib     Rehab efforts: PT/OT/SLP to evaluate and treat    Diagnostics ordered/pending: none    VTE prophylaxis: Heparin 5000 units SQ every 8 hours    BP parameters: Infarct: Post tPA, SBP <180

## 2018-05-02 NOTE — NURSING
Pt. Discharged to home with vss, nad noted, and denies pain.  All d/c instructions given and explained to pt. Including calling ems for stroke s/s and what they are and  modifiable risk factors of stroke, and medication orders.  Removed pt.'s IV line with catheter intact.

## 2018-05-02 NOTE — DISCHARGE SUMMARY
[facility]  Vascular Neurology  Comprehensive Stroke Center  Discharge Summary     Summary:     Admit Date: 4/27/2018 11:04 PM    Discharge Date and Time: 5/2/2018  3:50 PM    Attending Physician: Bello Alex MD    Discharge Provider: Ashley Phoenix PA-C    History of Present Illness: Patient is a 73 y.o. female with significant past medical history of GERD, HTN, CAD, Afib, and diverticulitis presented to hospital as a transfer from [facility] ED for evaluation of L MCA stroke symptoms.  Patient was LKN 1930.  She acutely developed aphasia, with right sided weakness.  CTH at OSH negative for acute findings.  Telestroke consult performed by Dr. Tinoco.  tPA administered.  Transferred to [facility] for further evaluation/possible intervention.  On arrival patient aphasic.  She has no other complaints besides the urge to urinate.  The patient was taken to CT for CTA MP.  Asia Noble and Hussain reviewed images as acquired.  No LVO.  Patient not a candidate for IR intervention.  Will be admitted to [facility] for close monitoring post tPA administration.      Hospital Course (synopsis of major diagnoses, care, treatment, and services provided during the course of the hospital stay): Lyn Rapp is a 73 y.o. female with PMHx of GERD, HTN, CAD, Afib, and diverticulosis who presented to hospital as a transfer from [facility] ED for evaluation of L MCA stroke symptoms. CTH at OSH negative for acute findings.Telestroke consult performed by Dr. Tinoco and tPA was administered.  Patient was not eligible for a thrombectomy because CTA multiphase revealed no LVO. She was admitted to neuro ICU for further care post tPA. MRI revealed small L temporal parietal lobe infarct. Echo revealed an EF of 48%, LV wall motion abnormalities, and severe LA enlargement. Patient had recently stopped taking her coumadin due to no provider following her INR. Etiology of patient's  stroke likely cardioembolic.     Patient remained stable in ICU and was stepped down on 04/29/18. She was evaluated by PT/OT and speech who recommended discharge home with no outpatient therapy needs. On day of step down, patient began developing LLQ and LUQ abdominal pain. Her symptoms progressed to N/V, bloody diarrhea, and fever. CT abdomen/pelvis revealed acute diverticulitis with small area of adjacent focal inflammatory change and air likely representing phlegmon with contained perforation. Patient was made NPO and started on IV cefepime and flagyl. She was treated symptomatically for pain and nausea/vomiting. Colorectal surgery was consulted for CT revealing phlegmon and contained perforation, no surgical intervention was required. Patient's N/V and abdominal pain began to improve. At discharge, she was still experiencing diarrhea, but was afebrile and tolerating a regular diet. Patient will continue an additional 7 days of oral antibiotics (cefdinir and flagyl) for complete treatment of her diverticulitis. She has had prolonged diarrhea, c diff study sample was taken on day of discharge. Stroke provider will follow up on results of stool sample. In addition, CT abdomen pelvis revealed enhancement that may represent pyelonephritis. Patient's UA on admission negative. She denies dysuria and flank pain, patient already on antibiotics with adequate coverage.     For secondary stroke prevention, patient will restart coumadin and her INR will be followed by her PCP. She will not required a statin due to LDL <70, patient will follow up with PCP for further management of cholesterol. She will resume all home medications. Patient will be discharged home and follow up with her PCP, cardiologist, and stroke clinic.       4/27: Admitted to [facility] s/p tPA at OSH for L MCA stroke symptoms, likely embolic in origin. CTH and CTA head/neck showed no acute abnormality, no high-grade stenosis or major vessel occlusion  4/28:  Aphasia improved overnight. Patient with mild residual expressive. Improvement in right sided strength. HR in 120s. Holding anti-coagulation due to recent tPA  4/29: No acute events. MRI brain showed small subacute left temporal parietal lobe infarct, no mass effect or intracranial hemorrhage. Further improvement in speech. Expressive aphasia has resolved. Right UE/LE strength improved. HR better controlled with atenolol (home med). Can re-start anticoagulation. Stepdown to floor  4/30: CT abdomen pelvis with acute diverticulitis and phlegmon with contained perforation, started on cefepime and flagyl, consulted colorectal surgery  5/1: starting clear liquid diet, seen by colorectal surgery and no intervention needed at this time  5/2: tolerated a clear liquid diet overnight, starting regular diet    STROKE DOCUMENTATION   Acute Stroke Times   Last Known Normal Date: 04/27/18  Last Known Normal Time: 1930  Symptom Onset Date: 04/27/18  Symptom Onset Time: 1930  Stroke Team Called Date: 04/27/18  Stroke Team Called Time: 2020  Stroke Team Arrival Date: 04/27/18  Stroke Team Arrival Time: 2022  CT Interpretation Time: 2030  Decision to Treat Time for Alteplase: 2045  Decision to Treat Time for IR: 0000 (not an IR candidate)     NIH Scale:  1a. Level Of Consciousness: 0-->Alert: keenly responsive  1b. LOC Questions: 0-->Answers both questions correctly  1c. LOC Commands: 0-->Performs both tasks correctly  2. Best Gaze: 0-->Normal  3. Visual: 0-->No visual loss  4. Facial Palsy: 0-->Normal symmetrical movements  5a. Motor Arm, Left: 0-->No drift: limb holds 90 (or 45) degrees for full 10 secs  5b. Motor Arm, Right: 0-->No drift: limb holds 90 (or 45) degrees for full 10 secs  6a. Motor Leg, Left: 0-->No drift: leg holds 30 degree position for full 5 secs  6b. Motor Leg, Right: 0-->No drift: leg holds 30 degree position for full 5 secs  7. Limb Ataxia: 0-->Absent  8. Sensory: 0-->Normal: no sensory loss  9. Best Language:  0-->No aphasia: normal  10. Dysarthria: 0-->Normal  11. Extinction and Inattention (formerly Neglect): 0-->No abnormality  Total (NIH Stroke Scale): 0        Modified Duplin Score: 1  [place] Coma Scale:    ABCD2 Score:    [id number]-UKJ Score:5  HAS -BLED Score:3  ICH Score:   Hunt & Garber Classification:       Assessment/Plan:     Diagnostic Results:    CT Abdomen/Pelvis 04/29/18  Acute diverticulitis with small area of adjacent focal inflammatory change and air likely representing phlegmon with contained perforation.    Segmental decreased enhancement at the interpolar region of the right kidney likely representing pyelonephritis, and less likely renal infarction given enhancing vessels in the region.    Postoperative change of cholecystectomy, hysterectomy, oophorectomy, and appendectomy.    Brain Imaging   CTH 4/27  No acute intracranial abnormality. Chronic changes      CTA head/neck 4/27  No acute abnormality. No high-grade stenosis or major vessel occlusion.     MRI brain. Date: 04/28/18  Small subacute left temporal parietal lobe infarct.  No mass effect or intracranial hemorrhage.    Mild scattered white matter disease, most consistent with chronic microvascular ischemic disease.     Vessel Imaging   CTA head/neck 4/27  No acute abnormality. No high-grade stenosis or major vessel occlusion.     Cardiac Imaging   2D echo 4/28    1 - Mildly depressed left ventricular systolic function (EF 45-50%).     2 - Concentric remodeling.     3 - Wall motion abnormalities.     4 - Low normal right ventricular systolic function .     5 - Mild to moderate mitral regurgitation.     6 - Trivial tricuspid regurgitation.     7 - The estimated PA systolic pressure is 28 mmHg.     Interventions: IV t-PA    Complications: None    Disposition: Home or Self Care    Final Active Diagnoses:    Diagnosis Date Noted POA    PRINCIPAL PROBLEM:  Embolic stroke involving left middle cerebral artery [I63.412] 04/27/2018 Yes    Abnormal  finding on CT scan [R93.8] 05/02/2018 Yes    Cytotoxic cerebral edema [G93.6] 05/02/2018 Unknown    Diverticulitis [K57.92] 04/29/2018 Yes    S/P admn tPA in diff fac w/n last 24 hr bef adm to crnt fac [Z92.82] 04/27/2018 Not Applicable    AF (paroxysmal atrial fibrillation) [I48.0]  Yes    Carotid arterial disease [I77.9] 04/22/2014 Yes    Benign essential HTN [I10] 09/12/2011 Yes      Problems Resolved During this Admission:    Diagnosis Date Noted Date Resolved POA    Hyperlipidemia [E78.5] 07/15/2013 05/02/2018 Yes     Cytotoxic cerebral edema    Area of cytotoxic cerebral edema identified when reviewing brain imaging in the territory of the L middle cerebral artery. There is not mass effect associated with it. We will continue to monitor the patients clinical exam for any worsening of symptoms which may indicate expansion of the stroke or the area of the edema resulting in the clinical change. The pattern is suggestive of embolic etiology.            Diverticulitis    Evident on CT abdomen/pelvis  Tolerated a clear liquid diet, starting regular diet  Continue cefepime and flagyl, transitioned to oral cefdinir and flagyl  zofran and phenergan for nausea  Colorectal surgery consulted, no intervention needed        AF (paroxysmal atrial fibrillation)    - Stroke risk factor.  Patient newly diagnosed w/Afib.  Reportedly stopped taking coumadin about 3 weeks ago due to concerns about bleeding  - restarting coumadin on 05/02/18        Benign essential HTN    -Stroke risk factor  -patient will resume home medications at discharge              Recommendations:     Post-discharge complication risks: None    Stroke Education given to: patient and family    Follow-up in Stroke Clinic in 4-6 weeks.     Discharge Plan:  Statin: None. Contraindicated due to LDL less than 70  Anticoagulant: Warfarin  Aggresive risk factor modification:  Hypertension  Atrial Fibrillation  Carotid Artery disease    Follow Up:  Follow-up  Information     Slime Thmoas MD On 5/16/2018.    Specialty:  Family Medicine  Why:  appt at 10:00 am  Contact information:  [address] [place] [address]  [phone]             [facility].    Specialty:  Neurology  Why:  The office will call you to schedule an appt in 4-6 weeks. If the office deos not call you by 5/2/18 please call to schedule an appt.  Contact information:  [address]  [facility] [address] [phone]  Additional information:  7th Floor           Chris Lewis On 5/7/2018.    Why:  appt at 9:30 am for INR/coumadin monitoring  Contact information:  [facility]  [address] [place] [address]  Phone: [phone]           Chris Lewis On 5/15/2018.    Why:  appt at 2:45 pm  Contact information:  [facility]  [address] [place] [address]  Phone: [phone]                 Patient Instructions:     Activity as tolerated         Medications:  Reconciled Home Medications:      Medication List      CHANGE how you take these medications    benazepril 10 MG tablet  Commonly known as:  LOTENSIN  Take 1 tablet (10 mg total) by mouth once daily.  What changed:  how much to take     furosemide 20 MG tablet  Commonly known as:  LASIX  TAKE 1 TABLET(20 MG) BY MOUTH EVERY DAY  What changed:  See the new instructions.     metroNIDAZOLE 500 MG tablet  Commonly known as:  FLAGYL  Take 1 tablet (500 mg total) by mouth every 8 (eight) hours.  What changed:  when to take this        CONTINUE taking these medications    atenolol 25 MG tablet  Commonly known as:  TENORMIN  Take 1 tablet (25 mg total) by mouth once daily.     CALCIUM 500 ORAL  Take 1,200 mg by mouth once daily.     escitalopram oxalate 20 MG tablet  Commonly known as:  LEXAPRO  TAKE ONE TABLET BY MOUTH ONCE DAILY     fluticasone 50 mcg/actuation nasal spray  Commonly known as:  FLONASE  1 spray by Nasal route as needed.     FLUZONE HIGH-DOSE 2017-18 (PF) 180 mcg/0.5 mL  vaccine  Generic drug:  influenza  ADM 0.5ML IM UTD     hydroCHLOROthiazide 25 MG tablet  Commonly known as:  HYDRODIURIL  Take 1 tablet (25 mg total) by mouth once daily.     hydrocodone-acetaminophen 5-325mg 5-325 mg per tablet  Commonly known as:  NORCO  Take 1 tablet by mouth every 4 (four) hours as needed for Pain.     ondansetron 4 MG Tbdl  Commonly known as:  ZOFRAN-ODT  Take 1 tablet (4 mg total) by mouth every 8 (eight) hours as needed.     triamcinolone acetonide 0.1% 0.1 % cream  Commonly known as:  KENALOG  Apply topically 2 (two) times daily.     VITAMIN D3 2,000 unit Cap  Generic drug:  cholecalciferol (vitamin D3)  Take 1 capsule by mouth once daily.     warfarin 5 MG tablet  Commonly known as:  COUMADIN  Take 1 tablet (5 mg total) by mouth Daily.        STOP taking these medications    amoxicillin-clavulanate 875-125mg 875-125 mg per tablet  Commonly known as:  AUGMENTIN     aspirin 81 MG EC tablet  Commonly known as:  ECOTRIN     cloNIDine 0.1 MG tablet  Commonly known as:  CATAPRES                    Ashley Phoenix PA-C  Comprehensive Stroke Center  Department of Vascular Neurology   [facility]

## 2018-05-02 NOTE — PLAN OF CARE
Plan:  1. Decrease prednisone to 0.3 mL once per day  2. Follow up in clinic in 1 month (March 8th)  3. Alicia to call with labs and tacrolimus results   Problem: Patient Care Overview  Goal: Plan of Care Review  Recommendations    1. Rec Low Fiber/ Residue, Cardiac diet.   2. If meal intake consistently <50%, order Boost Plus with meals.     RD to monitor.     Goals: Meet % EEN/EPN from meals  Nutrition Goal Status: new

## 2018-05-02 NOTE — ASSESSMENT & PLAN NOTE
Area of cytotoxic cerebral edema identified when reviewing brain imaging in the territory of the L middle cerebral artery. There is not mass effect associated with it. We will continue to monitor the patients clinical exam for any worsening of symptoms which may indicate expansion of the stroke or the area of the edema resulting in the clinical change. The pattern is suggestive of embolic etiology.

## 2018-05-02 NOTE — PROGRESS NOTES
[facility]  Colorectal Surgery  Progress Note    Patient Name: Lyn Rapp  MRN: [id number]  Admission Date: 4/27/2018  Hospital Length of Stay: 5 days  Attending Physician: Bello lAex MD    Subjective:     Interval History: NAEON.  Mild lower abdominal pain, stable.  Labs and vitals wnl.    Post-Op Info:  * No surgery found *          Medications:  Continuous Infusions:  Scheduled Meds:   aspirin  81 mg Oral Daily    atenolol  50 mg Oral Daily    atorvastatin  40 mg Oral Daily    benazepril  10 mg Oral Daily    ceFEPime (MAXIPIME) IVPB  2 g Intravenous Q12H    heparin (porcine)  5,000 Units Subcutaneous Q8H    metronidazole  500 mg Intravenous Q8H    potassium, sodium phosphates  2 packet Oral Once    sodium chloride 0.9%  3 mL Intravenous Q8H     PRN Meds:   acetaminophen    dextrose 50%    glucagon (human recombinant)    hydrALAZINE    insulin aspart U-100    labetalol    morphine    ondansetron    polyethylene glycol    promethazine        Objective:     Vital Signs (Most Recent):  Temp: 98.5 °F (36.9 °C) (05/02/18 0516)  Pulse: 86 (05/02/18 0719)  Resp: 18 (05/02/18 0516)  BP: (!) 155/86 (05/02/18 0516)  SpO2: (!) 92 % (05/02/18 0516) Vital Signs (24h Range):  Temp:  [98.5 °F (36.9 °C)-99.6 °F (37.6 °C)] 98.5 °F (36.9 °C)  Pulse:  [66-91] 86  Resp:  [16-18] 18  SpO2:  [91 %-97 %] 92 %  BP: (135-155)/(67-86) 155/86     Intake/Output - Last 3 Shifts     None          Physical Exam    NAD, AAOx3  RRR  CTAB  Abd S/ND/Mild LLQ TTP      Significant Labs:  BMP:     Recent Labs  Lab 05/02/18  0424   GLU 95      K 3.8      CO2 25   BUN 8   CREATININE 0.8   CALCIUM 8.8   MG 1.6     CBC:     Recent Labs  Lab 05/02/18  0424   WBC 9.65   RBC 4.25   HGB 11.9*   HCT 36.5*      MCV 86   MCH 28.0   MCHC 32.6           Assessment/Plan:     Diverticulitis    73 year old female with new onset of aphasia which has improved, now with abd pain, Ct pos for  diverticulitis with small perforation    No drainable fluid collection  Continue IV abx  Clear liquid diet  Will continue to follow              Dionicio Rolon MD  Colorectal Surgery  [facility]

## 2018-05-02 NOTE — ASSESSMENT & PLAN NOTE
73 year old female with new onset of aphasia which has improved, now with abd pain, Ct pos for diverticulitis with small perforation    No drainable fluid collection  Continue IV abx  Clear liquid diet  Will continue to follow

## 2018-05-02 NOTE — ASSESSMENT & PLAN NOTE
Evident on CT abdomen/pelvis  Tolerated a clear liquid diet, starting regular diet  Continue cefepime and flagyl  zofran and phenergan for nausea  Colorectal surgery consulted

## 2018-05-02 NOTE — ASSESSMENT & PLAN NOTE
Evident on CT abdomen/pelvis  Tolerated a clear liquid diet, starting regular diet  Continue cefepime and flagyl, transitioned to oral cefdinir and flagyl  zofran and phenergan for nausea  Colorectal surgery consulted, no intervention needed

## 2018-05-02 NOTE — ASSESSMENT & PLAN NOTE
CT abdomen pelvis with contrast revealed segmental decreased enhancement at the interpolar region of the right kidney likely representing pyelonephritis  Patient's UA negative  Denies dysuria and flank pain  Currently on antibiotics for diverticulitis

## 2018-05-03 ENCOUNTER — PATIENT OUTREACH (OUTPATIENT)
Dept: ADMINISTRATIVE | Facility: CLINIC | Age: 73
End: 2018-05-03

## 2018-05-03 LAB
C DIFF GDH STL QL: NEGATIVE
C DIFF TOX A+B STL QL IA: NEGATIVE

## 2018-05-03 NOTE — PATIENT INSTRUCTIONS
Stroke (Completed)    You have had a mild stroke, or cerebrovascular accident (CVA). This is caused by a loss of blood flow to part of your brain. This can occur when a blood clot forms inside the carotid artery (main artery from the heart to the brain) or inside the heart. When the clot travels to the brain, it can lodge in a blood vessel and block blood flow. The other common cause of stroke is a gradual narrowing of the arteries in the brain due to buildup of fatty deposits (plaque).  Symptoms  Blocked blood flow in different areas of the brain can cause different symptoms. If you have had a stroke before, a new one may be different. A memory aid for the basic signs of a stroke is F.A.S.T.  F.A.S.T.  · F: Face drooping, or numbness on one side. This may be more noticeable when you ask the affected person to smile.  · A: Arm weakness or numbness. The affected person may have trouble using or lifting one side.  · S: Speech difficulty. Speech may be slurred or hard to understand. The affected person may also use the wrong words.  · T: Time to call 911. Time is critical in treating a stroke. Call 911 as soon as you suspect a stroke has happened--even a small one. The sooner treatment is started the better, even if the symptoms go away.  Other common symptoms of a stroke include:  · Having difficulty getting the right words to come out  · Weakness in one leg  · Numbness on one side  · Difficulty walking  · Trouble with coordination  · Trouble with vision  · Headache  · Confusion  · Dizziness  Treatment  After you have had a stroke, you are at risk of having another. Be sure to follow up with your healthcare provider for further evaluation and treatment. If problems are found, your healthcare provider will recommend treatment with medicines and/or procedures.  To reduce your chance of having another stroke, you may be prescribed medicines. These include medicines to prevent blood clots, such as antiplatelet or  anticoagulant medicines.  Home care  · Rest at home and avoid exertion for the next few days.  · If your healthcare provider has prescribed medicines, take them as directed.  Follow-up care  Follow up with your healthcare provider, or as advised. Additional tests may be needed. If you had an X-ray, CT scan, MRI, or ECG (electrocardiogram), it will be reviewed by a specialist. You will be notified of any new findings that will affect your care.  Call 911  Contact emergency services right away if any of these occur:  · Any of your stroke symptoms worsen  · New problems with speech, confusion, vision, walking, coordination, facial droop, or weakness or numbness on one side of your body  · Severe headache, fainting spell, dizziness, or seizure  · Chest pain or shortness of breath  Remember F.A.S.T. (described above). If you notice warning signs and symptoms of stroke, CALL 911 without delay.  Date Last Reviewed: 9/21/2015  © 0030-3164 [employer]. [address] [place] [address], [place] [address]. All rights reserved. This information is not intended as a substitute for professional medical care. Always follow your healthcare professional's instructions.

## 2018-05-03 NOTE — PLAN OF CARE
05/02/18 1400   Final Note   Assessment Type Final Discharge Note   Discharge Disposition Home     Patient is discharged to home ewith no discharge needs. Patient's family will provide trnasportatin home today. Cm made PCP appt with Dr. Thomas on 5/16/18 at 10:00 am. Cm made Coumadin/INR appt with Dr. Lewis  on 5/7/18 at 9:30 am. Cm made Cardiology appt with Dr. Lewis on 5/15/18 at 2:45. Cm sent in basket message to Vascular Neurology for hospital follow up in 4-6 weeks.

## 2018-05-07 ENCOUNTER — TELEPHONE (OUTPATIENT)
Dept: NEUROLOGY | Facility: CLINIC | Age: 73
End: 2018-05-07

## 2018-05-07 NOTE — TELEPHONE ENCOUNTER
----- Message from Ada Chan RN sent at 5/2/2018 11:42 AM CDT -----  Contact: Ada Frias  Please schedule a hospital follow up appt in 4-6 weeks. The patient was seen by Isai while in patient. Please call the patient with date and time of appt.

## 2018-05-11 ENCOUNTER — HOME CARE VISIT (OUTPATIENT)
Dept: NEUROLOGY | Facility: HOSPITAL | Age: 73
End: 2018-05-11

## 2018-05-20 PROBLEM — I63.9 ISCHEMIC CEREBROVASCULAR ACCIDENT (CVA): Status: ACTIVE | Noted: 2018-05-20

## 2018-05-20 PROBLEM — R79.1 SUBTHERAPEUTIC INTERNATIONAL NORMALIZED RATIO (INR): Status: ACTIVE | Noted: 2018-05-20

## 2018-05-20 PROBLEM — H53.462 HOMONYMOUS HEMIANOPSIA, LEFT: Status: ACTIVE | Noted: 2018-05-20

## 2018-05-20 PROBLEM — I48.92 ATRIAL FLUTTER: Status: ACTIVE | Noted: 2018-05-20

## 2018-05-23 PROBLEM — I21.4 NON-ST ELEVATION MI (NSTEMI): Status: ACTIVE | Noted: 2018-05-23

## 2018-05-23 PROBLEM — I48.91 ATRIAL FIBRILLATION: Status: ACTIVE | Noted: 2018-05-20

## 2018-05-24 PROBLEM — I42.9 CARDIOMYOPATHY: Status: ACTIVE | Noted: 2018-05-24

## 2018-05-24 PROBLEM — R79.89 ELEVATED TSH: Status: ACTIVE | Noted: 2018-05-24

## 2018-05-25 PROBLEM — I42.9 CARDIOMYOPATHY: Status: ACTIVE | Noted: 2018-05-25

## 2018-05-25 PROBLEM — R79.89 ELEVATED TSH: Status: ACTIVE | Noted: 2018-05-25

## 2018-05-29 ENCOUNTER — TELEPHONE (OUTPATIENT)
Dept: NEUROLOGY | Facility: CLINIC | Age: 73
End: 2018-05-29

## 2018-05-29 NOTE — TELEPHONE ENCOUNTER
----- Message from Temi Currie sent at 5/29/2018  9:19 AM CDT -----  Contact: Valery Toure calling states that pt needs to see Dr Austin as soon as possible for a hospital follow up in [place],[facility]..[phone] (home)

## 2018-05-29 NOTE — TELEPHONE ENCOUNTER
Called to schedule the pt with Dr. Austin or Dr. Natarajan, no answer, unable to leave message. Will call back at later time.

## 2018-06-22 NOTE — PLAN OF CARE
Caller would like to discuss an/a Medication for trazodone . Writer advised caller of callback within 24-72 hours.    Patient Name: Desiree Dunn  Caller Name: self  Callback Number: [phone]  Best Availability: any  Additional Info: Patient calling to see if she can have an increased dosage from 50mg to 100mg as she is unable to sleep at night with only the 50mg.  Did you confirm the message with the caller?: yes    Thank you,  Rani Gee     Problem: Patient Care Overview  Goal: Plan of Care Review  Recommendations  Recommendation/Intervention:   1. As medically able, ADAT to Cardiac with texture per SLP.   2. If poor PO intake, recommend adding Boost Plus OS to all meals.   RD to monitor.    Goals: Patient to receive nutrition by RD follow-up  Nutrition Goal Status: new    Full assessment completed, see RD Note 4/28/2018.

## 2018-06-28 VITALS
WEIGHT: 150 LBS | HEART RATE: 60 BPM | RESPIRATION RATE: 20 BRPM | BODY MASS INDEX: 24.21 KG/M2 | OXYGEN SATURATION: 98 % | SYSTOLIC BLOOD PRESSURE: 138 MMHG | DIASTOLIC BLOOD PRESSURE: 90 MMHG

## 2018-06-28 NOTE — PROGRESS NOTES
Ms. Rapp doing well at home with her , very pleasant woman high functioning mentally and physically. Drives and performs all previous ADL's prior to her stroke. No complaints of headaches dizziness or nausea, BP/Pulse WNL. Eating/swallowing normal and states sleeps well through the night.

## 2018-07-12 ENCOUNTER — OFFICE VISIT (OUTPATIENT)
Dept: NEUROLOGY | Facility: CLINIC | Age: 73
End: 2018-07-12
Payer: MEDICARE

## 2018-07-12 VITALS
HEART RATE: 54 BPM | SYSTOLIC BLOOD PRESSURE: 141 MMHG | HEIGHT: 65 IN | BODY MASS INDEX: 24.68 KG/M2 | DIASTOLIC BLOOD PRESSURE: 85 MMHG | WEIGHT: 148.13 LBS

## 2018-07-12 DIAGNOSIS — I63.413 CEREBROVASCULAR ACCIDENT (CVA) DUE TO BILATERAL EMBOLISM OF MIDDLE CEREBRAL ARTERIES: Primary | ICD-10-CM

## 2018-07-12 DIAGNOSIS — I63.423 CEREBROVASCULAR ACCIDENT (CVA) DUE TO BILATERAL EMBOLISM OF ANTERIOR CEREBRAL ARTERIES: ICD-10-CM

## 2018-07-12 PROCEDURE — 99214 OFFICE O/P EST MOD 30 MIN: CPT | Mod: S$PBB,,, | Performed by: PSYCHIATRY & NEUROLOGY

## 2018-07-12 PROCEDURE — 99999 PR PBB SHADOW E&M-EST. PATIENT-LVL IV: CPT | Mod: PBBFAC,,, | Performed by: PSYCHIATRY & NEUROLOGY

## 2018-07-12 PROCEDURE — 99214 OFFICE O/P EST MOD 30 MIN: CPT | Mod: PBBFAC | Performed by: PSYCHIATRY & NEUROLOGY

## 2018-07-12 NOTE — PROGRESS NOTES
Lyn Rapp is a 73 y.o. year old female that  presents for stroke follow up. She is accompany by her .    HPI:  Mrs Rapp has HTN, CAD, PAF on apixaban, s/p loop recorder placement, LESLIE non complaint with CPAP, and history of cardio embolic L MCA territory infarct 4/18 and R MCA territory infarct 5/18 respectively.  She was initially admitted to our stroke service on 4/27 after sustaining a L MCA distribution infarct (she stopped coumadin on her own few days before the stroke) that was successfully treated with iv alteplase, and discharged home with NIHSS 0. and recommendation to continue coumadin as she was unable to afford a NOAC. Then, she was admitted to [facility] on 5/18 with a R MCA distribution infarct in the setting of sub optimal anticoagulation (INR 1.6). She did well and was released from the hospital and subsequently started on apixaban.  Said that is making a very good recovery although still having some impairment of fine motor skills in her right hand mainly and often times she has difficulty spelling and typing in her computer.  No recurrence of symptoms concerning for another cerebrovascular insult, no post stroke spells to suggest a seizure. Mild forgetfulness, no mood changes. Feels very sleepy and fatigue during the daytime but said that she is not using her CPAP machine.  Denies HA, vertigo, double vision, slurred speech, confusion, falls, or visual disturbances.  In the interim, she had a loop recorder placed on 5/22.    Past Medical History:   Diagnosis Date    Acid reflux     AF (paroxysmal atrial fibrillation)     Carotid arterial disease 4/22/2014    Coronary artery disease due to calcified coronary lesion 7/13/2016    nonobstructive    Diverticulitis 4/29/2018    Embolic stroke involving left middle cerebral artery 4/27/2018    Hypertension     Melanoma 1994    Melanoma of back     Mitral valve prolapse     Obstructive sleep apnea 7/15/2013     Social  History     Social History    Marital status:      Spouse name: N/A    Number of children: 2    Years of education: N/A     Occupational History    Not on file.     Social History Main Topics    Smoking status: Never Smoker    Smokeless tobacco: Never Used    Alcohol use No    Drug use: No    Sexual activity: Yes     Partners: Male     Other Topics Concern    Not on file     Social History Narrative    No narrative on file     Past Surgical History:   Procedure Laterality Date    APPENDECTOMY      APPENDECTOMY      BREAST BIOPSY Right     benign excisional biopsy    CARDIAC CATHETERIZATION      CHOLECYSTECTOMY      COLONOSCOPY      CORONARY ANGIOGRAPHY Left 5/25/2018    Procedure: Coronary angiography-RM # 422 A;  Surgeon: Hilton Underwood III, MD;  [facility];  Service: Cardiology;  Laterality: Left;    HYSTERECTOMY  1982    INSERTION OF IMPLANTABLE LOOP RECORDER N/A 5/22/2018    Procedure: Loop insertion-RM # 422 A;  Surgeon: Hilton Underwood III, MD;  Location: [facility];  Service: Cardiology;  Laterality: N/A;    MANDIBLE SURGERY      OOPHORECTOMY Bilateral 1982    TONSILLECTOMY      TUBAL LIGATION       Family History   Problem Relation Age of Onset    Hypertension Mother     No Known Problems Father            Review of Systems  General ROS: negative for chills, fever or weight loss  Psychological ROS: negative for hallucination, depression or suicidal ideation  Ophthalmic ROS: negative for blurry vision, photophobia or eye pain  ENT ROS: negative for epistaxis, sore throat or rhinorrhea  Respiratory ROS: no cough, shortness of breath, or wheezing  Cardiovascular ROS: no chest pain or dyspnea on exertion  Gastrointestinal ROS: no abdominal pain, change in bowel habits, or black/ bloody stools  Genito-Urinary ROS: no dysuria, trouble voiding, or hematuria  Musculoskeletal ROS: negative for gait disturbance or muscular weakness  Neurological ROS: no syncope or seizures; no  "ataxia  Dermatological ROS: negative for pruritis, rash and jaundice      Physical Exam:  BP (!) 141/85   Pulse (!) 54   Ht 5' 5" (1.651 m)   Wt 67.2 kg (148 lb 2.4 oz)   LMP  (LMP Unknown)   BMI 24.65 kg/m²   General appearance: alert, cooperative, no distress  Constitutional:Oriented to person, place, and time.appears well-developed and well-nourished.   HEENT: Normocephalic, atraumatic, neck symmetrical, no nasal discharge   Eyes: conjunctivae/corneas clear, PERRL, EOM's intact  Lungs: clear to auscultation bilaterally, no dullness to percussion bilaterally  Heart: regular rate and rhythm without rub; no displacement of the PMI   Abdomen: soft, non-tender; bowel sounds normoactive; no organomegaly  Extremities: extremities symmetric; no clubbing, cyanosis, or edema  Integument: Skin color, texture, turgor normal; no rashes; hair distrubution normal  Neurologic:   Mental status: alert and awake, oriented x 4, comprehension, naming, and repetition intact. No right to left confusion. Performs serial 7's without difficulty .No dysarthria.  CN 2-12: pupils 4 mm bilaterally, reactive to light. Fundi without papilledema. Visual fields full to confrontation. EOM full without nystagmus. Face sensation normal in all distributions. Face symmetric. Hearing grossly intact. Palate elevates well. Tongue midline without atrophy or fasciculations.  Motor: 5/5 all over except subtle weakness left  and left leg.  Sensory: intact in all modalities.  DTR's: 2+ all over.  Plantars: no tested.  Coordination: finger to nose and heel-knee-shin intact.  Gait: no ataxia or bradykinesia  Psychiatric: no pressured speech; normal affect; no evidence of impaired cognition     LABS:    Complete Blood Count  Lab Results   Component Value Date    RBC 3.64 (L) 05/26/2018    HGB 10.8 (L) 05/26/2018    HCT 32.3 (L) 05/26/2018    MCV 89 05/26/2018    MCH 29.7 05/26/2018    MCHC 33.4 05/26/2018    RDW 15.3 (H) 05/26/2018     05/26/2018 "    MPV 10.5 05/26/2018    GRAN 3.4 05/26/2018    GRAN 60.7 05/26/2018    LYMPH 1.4 05/26/2018    LYMPH 24.7 05/26/2018    MONO 0.6 05/26/2018    MONO 11.0 05/26/2018    EOS 0.2 05/26/2018    BASO 0.04 05/26/2018    EOSINOPHIL 2.9 05/26/2018    BASOPHIL 0.7 05/26/2018    DIFFMETHOD Automated 05/26/2018       Comprehensive Metabolic Panel  Lab Results   Component Value Date    GLU 97 05/26/2018    BUN 14 05/26/2018    CREATININE 0.76 05/26/2018     (L) 05/26/2018    K 3.6 05/26/2018     05/26/2018    PROT 6.1 05/22/2018    ALBUMIN 3.3 (L) 05/22/2018    BILITOT 0.9 05/22/2018    AST 25 05/22/2018    ALKPHOS 44 05/22/2018    CO2 29 05/26/2018    ALT 20 05/22/2018    ANIONGAP 5 (L) 05/26/2018    EGFRNONAA >60 05/26/2018    ESTGFRAFRICA >60 05/26/2018       TSH  Lab Results   Component Value Date    TSH 4.970 (H) 05/20/2018         Assessment: 72 y/o with HTN, CAD, PAF on apixaban, s/p loop recorder placement, LESLIE non complaint with CPAP, and history of cardio embolic L MCA territory infarct 4/18 and R MCA territory infarct 5/18 respectively.  She is making an excellent neurological recovery, currently has NIHSS 0, modified Mellette scale score 0.      ICD-10-CM ICD-9-CM    1. Cerebrovascular accident (CVA) due to bilateral embolism of middle cerebral arteries I63.413 434.11 Ambulatory consult to Occupational Therapy     The encounter diagnosis was Cerebrovascular accident (CVA) due to bilateral embolism of middle cerebral arteries.      Plan:  1) Cardio embolic infarcts bilateral MCA territories: on apixaban.  2) Fine motor skills impairment L hand mainly: requested OT.  3) HTN, stroke risk factor, managed by IM  4) PAF, on apixaban. Loop recorder placed and follow by cardiology  5) LESLIE, non complaint with CPAP: advised to follow with sleep medicine and see if other treatment options, as untreated sleep apnea associated with increased stroke risk and poor control of HTN, cardiac dysrhythmias.         Orders  Placed This Encounter   Procedures    Ambulatory consult to Occupational Therapy           Bello Alex MD

## 2018-07-26 PROBLEM — R48.3: Status: ACTIVE | Noted: 2018-07-26

## 2018-07-26 PROBLEM — R29.818 FINE MOTOR IMPAIRMENT: Status: ACTIVE | Noted: 2018-07-26

## 2018-07-26 PROBLEM — R29.898 FINE MOTOR IMPAIRMENT: Status: ACTIVE | Noted: 2018-07-26

## 2018-10-20 PROBLEM — R10.9 ABDOMINAL PAIN: Status: ACTIVE | Noted: 2018-10-20

## 2018-11-13 PROBLEM — I21.4 NON-ST ELEVATION MI (NSTEMI): Status: RESOLVED | Noted: 2018-05-23 | Resolved: 2018-11-13

## 2018-11-13 PROBLEM — K57.92 DIVERTICULITIS: Status: RESOLVED | Noted: 2018-04-29 | Resolved: 2018-11-13

## 2018-11-13 PROBLEM — R48.3: Status: RESOLVED | Noted: 2018-07-26 | Resolved: 2018-11-13

## 2018-11-13 PROBLEM — Z92.82 S/P ADMN TPA IN DIFF FAC W/N LAST 24 HR BEF ADM TO CRNT FAC: Status: RESOLVED | Noted: 2018-04-27 | Resolved: 2018-11-13

## 2018-11-13 PROBLEM — R10.9 ABDOMINAL PAIN: Status: RESOLVED | Noted: 2018-10-20 | Resolved: 2018-11-13

## 2019-01-04 PROBLEM — H53.462 HOMONYMOUS HEMIANOPSIA, LEFT: Status: RESOLVED | Noted: 2018-05-20 | Resolved: 2019-01-04

## 2019-09-16 PROBLEM — I48.0 PAF (PAROXYSMAL ATRIAL FIBRILLATION): Status: ACTIVE | Noted: 2019-09-16

## 2019-09-19 PROBLEM — H53.459 PERIPHERAL VISUAL FIELD DEFECT: Status: ACTIVE | Noted: 2019-09-19

## 2019-10-15 PROBLEM — Z98.890 S/P ABLATION OF ATRIAL FIBRILLATION: Status: ACTIVE | Noted: 2019-10-15

## 2019-10-15 PROBLEM — Z86.79 S/P ABLATION OF ATRIAL FIBRILLATION: Status: ACTIVE | Noted: 2019-10-15

## 2020-06-10 PROBLEM — F51.01 PRIMARY INSOMNIA: Status: ACTIVE | Noted: 2020-06-10

## 2021-05-03 PROBLEM — I48.0 PAROXYSMAL ATRIAL FIBRILLATION: Status: ACTIVE | Noted: 2021-05-03

## 2023-07-05 NOTE — ASSESSMENT & PLAN NOTE
Contributing Nutrition Diagnosis  Inadequate energy intake    Related to (etiology):   Decreased ability to consume sufficient energy    Signs and Symptoms (as evidenced by):   NPO with no alternative means of nutrition at this time     Nutrition Diagnosis Status:   New     47y female with pmhx of HTN, CHF, Afib on Eliquis and metoprolol who presents to the ED for dizziness who was found to be in AFIB with RVR and acute decompensated heart failure.     - Needs CCU for diuresis  - Restart HF medications  - Restart Eliquis, she has not been taking it consistently  - Will need TROY/DCCV on this admission

## 2024-01-15 ENCOUNTER — TELEPHONE (OUTPATIENT)
Dept: NEUROLOGY | Facility: CLINIC | Age: 79
End: 2024-01-15
Payer: MEDICARE

## 2024-01-16 ENCOUNTER — TELEPHONE (OUTPATIENT)
Dept: NEUROLOGY | Facility: CLINIC | Age: 79
End: 2024-01-16
Payer: MEDICARE

## 2024-01-16 NOTE — TELEPHONE ENCOUNTER
Spoke to the pt, appt rescheduled to 1/29/24 at 1300 with Amarilis Villanueva.  Date, time and location discussed.  Weather.

## 2024-01-29 ENCOUNTER — LAB VISIT (OUTPATIENT)
Dept: LAB | Facility: HOSPITAL | Age: 79
End: 2024-01-29
Attending: NURSE PRACTITIONER
Payer: MEDICARE

## 2024-01-29 ENCOUNTER — OFFICE VISIT (OUTPATIENT)
Dept: NEUROLOGY | Facility: CLINIC | Age: 79
End: 2024-01-29
Payer: MEDICARE

## 2024-01-29 VITALS
SYSTOLIC BLOOD PRESSURE: 131 MMHG | HEART RATE: 68 BPM | BODY MASS INDEX: 24.96 KG/M2 | DIASTOLIC BLOOD PRESSURE: 82 MMHG | HEIGHT: 66 IN | WEIGHT: 155.31 LBS

## 2024-01-29 DIAGNOSIS — R41.3 MEMORY LOSS: ICD-10-CM

## 2024-01-29 DIAGNOSIS — Z98.890 S/P ABLATION OF ATRIAL FIBRILLATION: ICD-10-CM

## 2024-01-29 DIAGNOSIS — Z86.73 HISTORY OF EMBOLIC STROKE: Primary | ICD-10-CM

## 2024-01-29 DIAGNOSIS — Z81.8 FAMILY HISTORY OF DEMENTIA: ICD-10-CM

## 2024-01-29 DIAGNOSIS — F51.01 PRIMARY INSOMNIA: ICD-10-CM

## 2024-01-29 DIAGNOSIS — Z86.79 S/P ABLATION OF ATRIAL FIBRILLATION: ICD-10-CM

## 2024-01-29 LAB
FOLATE SERPL-MCNC: 8.6 NG/ML (ref 4–24)
TSH SERPL DL<=0.005 MIU/L-ACNC: 2.13 UIU/ML (ref 0.4–4)
VIT B12 SERPL-MCNC: 599 PG/ML (ref 210–950)

## 2024-01-29 PROCEDURE — 99205 OFFICE O/P NEW HI 60 MIN: CPT | Mod: S$PBB,,, | Performed by: NURSE PRACTITIONER

## 2024-01-29 PROCEDURE — 84425 ASSAY OF VITAMIN B-1: CPT | Performed by: NURSE PRACTITIONER

## 2024-01-29 PROCEDURE — 82607 VITAMIN B-12: CPT | Performed by: NURSE PRACTITIONER

## 2024-01-29 PROCEDURE — 99214 OFFICE O/P EST MOD 30 MIN: CPT | Mod: PBBFAC,PO | Performed by: NURSE PRACTITIONER

## 2024-01-29 PROCEDURE — 82746 ASSAY OF FOLIC ACID SERUM: CPT | Performed by: NURSE PRACTITIONER

## 2024-01-29 PROCEDURE — 83921 ORGANIC ACID SINGLE QUANT: CPT | Performed by: NURSE PRACTITIONER

## 2024-01-29 PROCEDURE — 86592 SYPHILIS TEST NON-TREP QUAL: CPT | Performed by: NURSE PRACTITIONER

## 2024-01-29 PROCEDURE — 84443 ASSAY THYROID STIM HORMONE: CPT | Performed by: NURSE PRACTITIONER

## 2024-01-29 PROCEDURE — 36415 COLL VENOUS BLD VENIPUNCTURE: CPT | Mod: PO | Performed by: NURSE PRACTITIONER

## 2024-01-29 PROCEDURE — 99999 PR PBB SHADOW E&M-EST. PATIENT-LVL IV: CPT | Mod: PBBFAC,,, | Performed by: NURSE PRACTITIONER

## 2024-01-29 RX ORDER — LATANOPROST 50 UG/ML
SOLUTION/ DROPS OPHTHALMIC
COMMUNITY
Start: 2024-01-20

## 2024-01-29 NOTE — PROGRESS NOTES
NEUROLOGY  Outpatient Consultation Visit     [facility]  [address] [facility] [address], [place] [address]  [phone] (office) / [phone] (fax)    Patient Name:  Lyn Rapp  :  1945  MR #:  [id number]  Acct #:  [id number]    Date of  Visit: 2024    Other Physicians:  Meseret Brewster MD (Primary Care Physician)      CHIEF COMPLAINT: Memory Loss        HISTORY OF PRESENT ILLNESS:  Lyn Rapp is a 78 y.o. R-handed female seen in consultation for memory loss per Meseret Brewster MD    Medical history is significant for L MCA CVA, anxiety, pAfib, HLD, HTN, CAD, osteopenia, insomnia, LESLIE not on CPAP    Here today alone. , lives in [place]. She has a Master's degree in English, education. 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     States here today for a baseline evaluation. She is concerned about developing dementia because her sister was recently diagnosed. If not for that, she wouldn't have any concern about her memory. She is independent with ADLS and iADLs. She drives without difficulty. She manages her medications and finances. She forgets telephone conversations sometimes. She doesn't misplace things. She doesn't have any language issues. She also denies executive dysfunction.     She has trouble falling asleep. She takes Benadryl at night. She has LESLIE, but didn't tolerate CPAP. PCP gave her trazodone, but it didn't help. She only took 50 mg.     She has situational depression. She takes Lexapro and does well overall.     She has not had any hallucinations.     No physical changes, like falls or gait issues. Continent.     Her sister was just diagnosed with dementia. She is 86.     She had 2 CVAs in 2018. This occurred in the setting of stopping her Coumadin, which she was taking due to Afib. She was subsequently transitioned to Eliquis. She has since had an ablation and is off of DOAC per Dr. Quintana. She takes ASA and statin daily. The strokes caused  her some vision loss, but it has improved. She sees ophthalmology in [place].     Non smoker. No regular ETOH use.     Allergies:  Review of patient's allergies indicates:   Allergen Reactions    Ciprofloxacin Hives and Swelling     Swelling in throat    Amoxicillin Diarrhea    Bactrim [sulfamethoxazole-trimethoprim] Rash    Sulfur Hives    Toprol xl [metoprolol succinate] Other (See Comments)     Profuse sweating       Current Medications:  Current Outpatient Medications   Medication Sig Dispense Refill    amLODIPine (NORVASC) 2.5 MG tablet Take 1 tablet (2.5 mg total) by mouth once daily. 90 tablet 0    aspirin (ECOTRIN) 81 MG EC tablet Take 1 tablet (81 mg total) by mouth once daily.  0    cholecalciferol, vitamin D3, (VITAMIN D3) 2,000 unit Cap Take 1 capsule by mouth once daily.      diclofenac sodium (VOLTAREN) 1 % Gel Apply 2 g topically 4 (four) times daily as needed (pain). 150 g 3    EScitalopram oxalate (LEXAPRO) 20 MG tablet Take 1 tablet (20 mg total) by mouth once daily. 90 tablet 0    fexofenadine (ALLEGRA) 180 MG tablet Take 180 mg by mouth daily as needed.       fluticasone (FLONASE) 50 mcg/actuation nasal spray 1 spray by Nasal route as needed.   4    hydroCHLOROthiazide (MICROZIDE) 12.5 mg capsule Take 1 capsule (12.5 mg total) by mouth once daily. 90 capsule 0    ibuprofen (ADVIL,MOTRIN) 800 MG tablet Take 1 tablet (800 mg total) by mouth 2 (two) times daily as needed for Pain. 30 tablet 2    latanoprost 0.005 % ophthalmic solution Place into both eyes.      losartan (COZAAR) 100 MG tablet Take 1 tablet by mouth in the evening 90 tablet 0    MAGNESIUM ORAL Take by mouth every other day. 1 tablespoon daily      pravastatin (PRAVACHOL) 20 MG tablet Take 1 tablet by mouth once daily 90 tablet 0    UNABLE TO FIND medication name: Immuneti 2 capsules by mouth once daily      traZODone (DESYREL) 50 MG tablet Take 1 tablet (50 mg total) by mouth every evening. 30 tablet 2     No current facility-administered  medications for this visit.       Past Medical History:  Past Medical History:   Diagnosis Date    Acid reflux     AF (paroxysmal atrial fibrillation)     Carotid arterial disease 4/22/2014    Coronary artery disease due to calcified coronary lesion 7/13/2016    nonobstructive    Diverticulitis 4/29/2018    Diverticulitis 4/29/2018    Embolic stroke involving left middle cerebral artery 4/27/2018    H/O Malignant melanoma 9/12/2011    Hypertension     Melanoma 1994    Melanoma of back     Mitral valve prolapse     Non-ST elevation MI (NSTEMI) 5/23/2018    Obstructive sleep apnea 7/15/2013       Past Surgical History:  Past Surgical History:   Procedure Laterality Date    ABLATION Left 9/16/2019    Procedure: Ablation;  Surgeon: Gaurav Quintana III, MD;  Location: [facility] CATH;  Service: Cardiology;  Laterality: Left;    ABLATION  09/16/2019    heart ablation for afib    APPENDECTOMY      APPENDECTOMY      BREAST BIOPSY Right 1993    benign excisional biopsy     CARDIAC CATHETERIZATION  2016    CARDIAC ELECTROPHYSIOLOGY STUDY  9/16/2019    Procedure: Study possible ablation;  Surgeon: Gaurav Quintana III, MD;  [facility];  Service: Cardiology;;    CHOLECYSTECTOMY      COLONOSCOPY      CORONARY ANGIOGRAPHY Left 5/25/2018    Procedure: Coronary angiography-RM # 422 A;  Surgeon: Hilton Underwood III, MD;  Location: [facility] CATH;  Service: Cardiology;  Laterality: Left;    CYSTOSCOPY W/ URETERAL STENT PLACEMENT Right 10/20/2018    Procedure: CYSTOSCOPY, WITH URETERAL STENT INSERTION;  Surgeon: Garry Vázquez MD;  Location: [facility] OR;  Service: Urology;  Laterality: Right;    HYSTERECTOMY  1982    INSERTION OF IMPLANTABLE LOOP RECORDER N/A 5/22/2018    Procedure: Loop insertion-RM # 422 A;  Surgeon: Hilton Underwood III, MD;  Location: [facility] CATH;  Service: Cardiology;  Laterality: N/A;    MANDIBLE SURGERY      OOPHORECTOMY  1978    OOPHORECTOMY  1982    w/Hysterectomy    TONSILLECTOMY      TREATMENT OF CARDIAC ARRHYTHMIA   "9/16/2019    Procedure: Cardioversion or Defibrillation;  Surgeon: Gaurav Quintana III, MD;  Location[facility];  Service: Cardiology;;    TUBAL LIGATION         Family History:  family history includes Breast cancer in her cousin; Hypertension in her mother; No Known Problems in her father.    Social History:   reports that she has never smoked. She has never used smokeless tobacco. She reports that she does not drink alcohol and does not use drugs.      REVIEW OF SYSTEMS:  As per HPI    PHYSICAL EXAM:  /82 (BP Location: Right arm, Patient Position: Sitting, BP Method: Small (Automatic))   Pulse 68   Ht 5' 6" (1.676 m)   Wt 70.5 kg (155 lb 5 oz)   LMP  (LMP Unknown) Comment: complete 1982  BMI 25.07 kg/m²     General: Well groomed. No acute distress.  Cardiovascular: Regular rate and rhythm.   Pulmonary: Normal effort and rate.   Musculoskeletal: arthritic hands.   Extremities: No clubbing, cyanosis or edema.     Neurological exam:  Mental status: Awake and alert.  Oriented to person, place, time and situation. Recent and remote memory appear to be intact in conversation. Recalls 2/5 on testing. Fund of knowledge normal. Decreased attention and concentration. MOCA 24/30.   Speech/Language: Fluent and appropriate. No dysarthria or aphasia on conversation. Able to follow complex commands.   Cranial nerves (II-XII): Visual fields full, counts fingers x 4 quads. Pupils equal round and reactive to light, extraocular movements intact, no ptosis, no nystagmus. Facial sensation and symmetry intact bilaterally. Hearing grossly intact. Palate mobile and midline. Shoulder shrug normal bilaterally. Normal tongue protrusion.   Motor: 5 out of 5 strength throughout the upper and lower extremities bilaterally. Normal bulk and tone. No abnormal movements noted. No drift appreciated.   Sensation: Intact to light touch. Decreased to temp in the LLE, seems to have some L sensory neglect with double stimulation.   DTR: Mute " at the knees and biceps bilaterally.  Coordination: Finger-nose-finger testing intact bilaterally. GUS abnormal in LUE, arthritic hand. No tremor. Romberg absent.   Gait: Normal gait.     DIAGNOSTIC DATA:  I have personally reviewed provider notes, labs and imaging made available to me today.     Imaging:  MRI brain wo 3/2019:  FINDINGS:  INTRACRANIAL: Large old right MCA territory infarct with associated encephalomalacia underlying gliosis and cortical laminar necrosis as a vault compared to the study from 05/23/2018 on which it was subacute.  Old left frontal operculum infarct and encephalomalacia is unchanged.  Stable scattered T2 FLAIR hyperintense white matter foci are present, likely related to chronic microvascular ischemic change.   No parenchymal restricted diffusion.  No evidence of intracranial hemorrhage.  No extra-axial fluid collection or mass.  No intracranial mass effect.  No hydrocephalus.  Midline structures have a normal configuration.  Visualized pituitary gland and infundibulum are normal.  Visualized major intracranial vascular structures demonstrate normal flow voids and are normal in course and caliber.     SINUSES: Trace bilateral ethmoid and maxillary sinus mucosal thickening.  Mastoid air cells are clear.     ORBITS: Bilateral lens replacements.  Orbits are otherwise unremarkable.     IMPRESSION:      1. No acute intracranial abnormality.  2. Chronic large right MCA and small left MCA territory infarcts.  Stable chronic microvascular ischemic change.    CUS 5/2018:  IMPRESSION:      No flow limiting stenosis in the bilateral carotids..    CTA head and neck 5/2018:  IMPRESSION:      There is similar symmetric appearance of the neck as well as [id number] vessels.  If there is focal neurologic finding or clinical concern of stroke, MRI would be more sensitive in the evaluation.    Cardiac:  EKG 6/2023:  NSR with 1st degree AVB    Labs:  Lab Results   Component Value Date    WBC 5.27  "09/13/2023    HGB 12.6 09/13/2023    HCT 39.0 09/13/2023     09/13/2023    MCV 89 09/13/2023    RDW 13.3 09/13/2023     Lab Results   Component Value Date     09/13/2023    K 3.9 09/13/2023     09/13/2023    CO2 30 09/13/2023    BUN 27 (H) 09/13/2023    CREATININE 1.00 09/13/2023    GLU 95 09/13/2023    CALCIUM 9.8 09/13/2023    MG 2.2 05/21/2018    PHOS 3.1 05/21/2018     Lab Results   Component Value Date    PROT 6.5 09/13/2023    ALBUMIN 3.9 09/13/2023    BILITOT 0.9 09/13/2023    AST 27 09/13/2023    ALKPHOS 53 09/13/2023    ALT 15 09/13/2023     Lab Results   Component Value Date    INR 2.2 05/29/2018     Lab Results   Component Value Date    CHOL 183 09/13/2023    HDL 73 09/13/2023    LDLCALC 81.6 09/13/2023    TRIG 142 09/13/2023    CHOLHDL 39.9 09/13/2023     Lab Results   Component Value Date    HGBA1C 5.6 05/20/2018      No results found for: "[id number]"  No results found for: "FOLATE"  Lab Results   Component Value Date    TSH 3.490 09/13/2023           ASSESSMENT & PLAN:  Lyn Rapp is a 78 y.o. R-handed female seen in consultation for memory loss.     Problem List Items Addressed This Visit          Neuro    History of embolic stroke - Primary    Overview     May & July 2018 -- L and R MCA, cardioembolic etiology          Current Assessment & Plan     Now off of AC per EP, s/p ablation for Afib. Last EKG shows SR with AVB.   Statin, last LDL near goal               Psychiatric    Family history of dementia    Current Assessment & Plan     Her 86 yr old sister was recently diagnosed, but details are limited    Baseline exam today not concerning. MOCA 24/30. Reviewed brain imaging with chronic strokes as above. She is independent with ADLs and iADLs. There do not seem to be any safety concerns, although family input would be helpful.   Defers updated MRI brain. Reasonable given her situation.   Will update serologies to r/o any metabolic factors that could contribute to cogntive " changes  Recommend dc Benadryl due to AC properties and resume trazodone trial for insomnia, increase dose to 100 mg   LESLIE, intolerant to CPAP in the past   Repeat cog testing in 6 months             Cardiac/Vascular    S/P ablation of atrial fibrillation       Other    Primary insomnia     Other Visit Diagnoses       Memory loss                Follow up: 6 months for memory     I spent a total of 60 minutes on the day of the visit.    This includes face to face time with the patient, as well as non-face to face time preparing for and completing the visit (review of prior diagnostic testing and clinical notes, obtaining or reviewing history, documenting clinical information in the EMR, independently interpreting and communicating results to the patient/family and coordinating ongoing care).       I appreciate the opportunity to participate in the care of this patient. Please feel free to contact me with any concerns or questions.       Amarilis Villanueva, ACNPC-AG  [facility]  [address] [place] [address]

## 2024-01-29 NOTE — PATIENT INSTRUCTIONS
Mild cognitive impairment (MCI) is defined as the transitional state between the cognitive changes of normal aging and the fully developed features of dementia.   Some people with MCI experience little to no disease progression while others may progressively decline. Unfortunately, the clinical progression is unpredictable.     We will obtain some basic labs to rule out any reversible causes for your memory loss, such as a nutritional deficiency or a thyroid problem. We do not need to get another MRI of the brain given that you had one fairly recently and have not had any significant changes.     In the setting of MCI, it is very important to work diligently to keep the blood vessels healthy to prevent any worsening. You should continue to work closely with your PCP to control your blood pressure, cholesterol and blood sugar. The [place]iter[place] diet is also recommended to promote overall vascular health.     Medications like Benadryl can have adverse cognitive side effects. I do not recommend this as a long term sleep aid. Melatonin is a safe over the counter option.   The trazodone that you were previously given for sleep is widely used to treat insomnia. If you did not notice a difference with 50 mg, you can increase the dose to 100 mg at bedtime. Poor sleep can also cause memory issues.     While memory loss may not be reversible in many cases, we do know that there are several steps that you can take to prevent further memory loss:  Diet:  A Mediterranean style diet has been shown to be beneficial. This diet typically includes fresh fruits/vegetables, whole grains, fish and poultry. Foods, such as red meat, dairy and processed foods are avoided.   Research indicates certain nutrients may aid in brain function, such as B vitamins (especially B6, B12, and folic acid), antioxidants (such as vitamins C and E, and beta carotene), and Omega-3 fatty acids.  Minimize or eliminate the use of alcohol      Medications:  Discuss any prescription or over the counter medications you might be taking with your doctor to avoid those that can cause sedation or worsen cognitive function, such as sleep aids, benzodiazepines, and antihistamines.     Socialization and Exercise:  30-45 minutes of brisk physical activity 5 days/week has been shown to improve function in vascular dementias, lower the risk of stroke and slow the progression of memory loss  Activities that are engaging or mentally stimulating, such as word puzzles, jigsaw puzzles and Sudoku, are also beneficial to cognitive health  Regular interaction with friends, family and community are also known to be helpful.     Sleep:  Establish a regular, consistent sleep pattern and practice good sleep hygiene.    Avoid screen time (computer, TV, smartphones or tablets) or heavy meals for at least an hour before bedtime.   Avoid caffeine or stimulants after 2 PM.   Exercise earlier in the day or mornings and keep your sleeping environment comfortable. Bedtime and wake-up times should be consistent every night and morning so the body becomes used to a single routine, even on the weekends.   Having a wind down routine (e.g., soft lights in the house, bath before bed, reduced fluid intake, songs, reading, less noise) also helps to promote sleep readiness.   Untreated obstructive sleep apnea can lead to an increased risk for stroke and further memory loss      STRATEGIES TO COPE WITH YOUR COGNITIVE DEFICITS:  Pay Attention!  Reduce distractions in the area  Look at the person speaking to you & paraphrase what they are saying  Write down important things  Ask them to repeat themselves if you zone out  Ask people to simplify or reduce information if you need to  Processing Speed  Using multiple methods to learn new information, such as listening, taking notes, and recording, can be helpful  Give yourself enough time to complete certain tasks to reduce frustration  Executive  Functioning  Don't try to multi-task. Do tasks separately to ensure that each gets completed.   Use a calendar or planner to keep you on track  Write down steps to complicated tasks in case you forget  Storing Information  Immediately after you learn something, try to recall it. Repeat this and gradually lengthen the interval between your recalls  Recalling information   Jog your memory! If you loose something, try to think back to when you last had it. Mentally walk yourself through the steps of your day to prod your memory.   Use clues, timers, memos, notes, etc.  Stay organized - keep your keys in a certain place, put your important papers in a certain place, etc.   Having a routine helps to anchor memories as well      Follow up in 6 months for repeat assessment  Call or message sooner for any issues or concerns

## 2024-01-29 NOTE — ASSESSMENT & PLAN NOTE
Her 86 yr old sister was recently diagnosed, but details are limited    Baseline exam today not concerning. MOCA 24/30. Reviewed brain imaging with chronic strokes as above. She is independent with ADLs and iADLs. There do not seem to be any safety concerns, although family input would be helpful.   Defers updated MRI brain. Reasonable given her situation.   Will update serologies to r/o any metabolic factors that could contribute to cogntive changes  Recommend dc Benadryl due to AC properties and resume trazodone trial for insomnia, increase dose to 100 mg   LESLIE, intolerant to CPAP in the past   Repeat cog testing in 6 months

## 2024-01-29 NOTE — ASSESSMENT & PLAN NOTE
Now off of AC per EP, s/p ablation for Afib. Last EKG shows SR with AVB.   Statin, last LDL near goal

## 2024-01-30 LAB — RPR SER QL: NORMAL

## 2024-02-02 LAB
METHYLMALONATE SERPL-SCNC: 0.45 UMOL/L
VIT B1 BLD-MCNC: 53 UG/L (ref 38–122)

## 2024-02-06 ENCOUNTER — TELEPHONE (OUTPATIENT)
Dept: NEUROLOGY | Facility: CLINIC | Age: 79
End: 2024-02-06
Payer: MEDICARE

## 2024-02-06 NOTE — TELEPHONE ENCOUNTER
----- Message from Amarilis Villanueva NP sent at 2/6/2024  9:52 AM CST -----  Your labs didn't show anything worrisome, but does indicate B12 deficiency.     Please start taking an over the counter vitamin B12 supplement daily  Dose is 1000 mcg per day  Best route is sublingual (under the tongue)        Amarilis Garcia NP

## 2024-03-21 ENCOUNTER — PATIENT MESSAGE (OUTPATIENT)
Dept: NEUROLOGY | Facility: CLINIC | Age: 79
End: 2024-03-21
Payer: MEDICARE

## 2024-08-14 ENCOUNTER — OFFICE VISIT (OUTPATIENT)
Dept: NEUROLOGY | Facility: CLINIC | Age: 79
End: 2024-08-14
Payer: MEDICARE

## 2024-08-14 VITALS
BODY MASS INDEX: 25.14 KG/M2 | DIASTOLIC BLOOD PRESSURE: 73 MMHG | SYSTOLIC BLOOD PRESSURE: 128 MMHG | WEIGHT: 150.88 LBS | HEART RATE: 69 BPM | HEIGHT: 65 IN

## 2024-08-14 DIAGNOSIS — G47.33 OSA (OBSTRUCTIVE SLEEP APNEA): ICD-10-CM

## 2024-08-14 DIAGNOSIS — R41.3 MEMORY LOSS: Primary | ICD-10-CM

## 2024-08-14 DIAGNOSIS — F51.01 PRIMARY INSOMNIA: ICD-10-CM

## 2024-08-14 PROCEDURE — 99214 OFFICE O/P EST MOD 30 MIN: CPT | Mod: PBBFAC,PO | Performed by: NURSE PRACTITIONER

## 2024-08-14 PROCEDURE — 99214 OFFICE O/P EST MOD 30 MIN: CPT | Mod: S$PBB,,, | Performed by: NURSE PRACTITIONER

## 2024-08-14 PROCEDURE — 99999 PR PBB SHADOW E&M-EST. PATIENT-LVL IV: CPT | Mod: PBBFAC,,, | Performed by: NURSE PRACTITIONER

## 2024-08-14 RX ORDER — TALC
3 POWDER (GRAM) TOPICAL NIGHTLY
COMMUNITY
Start: 2024-08-14 | End: 2025-08-14

## 2024-08-14 RX ORDER — TRAZODONE HYDROCHLORIDE 150 MG/1
150 TABLET ORAL NIGHTLY
Qty: 90 TABLET | Refills: 1 | Status: SHIPPED | OUTPATIENT
Start: 2024-08-14 | End: 2025-02-10

## 2024-08-14 NOTE — PATIENT INSTRUCTIONS
"Try taking 150 mg of the trazodone for sleep.     If that doesn't work, can try an alternative medication like Remeron or Belsomra. I do not recommend medications like Xanax for sleep, as these carry risk of cognitive effects.     Anything over the counter for sleep that says PM typically contains Benadryl (diphenhydramine) or something similar. I do not recommend using this due to the cognitive side effects.     I do recommend that you start taking melatonin nightly at bedtime to help reset your body clock. Usual starting dose is 3-5 mg, but you can take up to 15 mg.     Continue the daily B12 supplement.     Healthy Sleep Habits:  Keep a consistent schedule with a set wake time and a set bed time  Try to set a bedtime that will allow you to get at least 7 hours of sleep  Your bed should be reserved for the "3 S's" - Sleep, Sex and Sickness  Establish a relaxing bedtime routine - listen to soft music, do some light stretches, read, etc.- in the 30 minutes before bed  Limit exposure to bright light in the evening, as this can limit your brain's natural production of melatonin, the "sleep hormone"  Unplug from electronics at least 30 minutes before bed -- cell phones, laptops and tablets emit blue light and cause brain stimulation that may interfere with melatonin production and relaxation  Avoid consuming caffeine in the late afternoon/evening  Avoid consuming alcohol before bedtime - alcohol may help you fall asleep, but can actually disrupt normal sleep cycles during the night  Don't toss and turn - It helps to establish a "mental" connection between being in bed and actually being asleep. If you don't fall asleep after being in bed for 20 minutes, get out of bed. Try to do something that will help relax you before trying to fall asleep again.   Try to avoid napping during the day, as naps can affect how you much sleep you get at night    While memory loss may not be reversible in many cases, we do know that there " are several steps that you can take to prevent further memory loss:  Diet:  A Mediterranean style diet has been shown to be beneficial. This diet typically includes fresh fruits/vegetables, whole grains, fish and poultry. Foods, such as red meat, dairy and processed foods are avoided.   Research indicates certain nutrients may aid in brain function, such as B vitamins (especially B6, B12, and folic acid), antioxidants (such as vitamins C and E, and beta carotene), and Omega-3 fatty acids.  Minimize or eliminate the use of alcohol     Medications:  Discuss any prescription or over the counter medications you might be taking with your doctor to avoid those that can cause sedation or worsen cognitive function, such as sleep aids, benzodiazepines, and antihistamines.     Socialization and Exercise:  30-45 minutes of brisk physical activity 5 days/week has been shown to improve function in vascular dementias, lower the risk of stroke and slow the progression of memory loss  Activities that are engaging or mentally stimulating, such as word puzzles, jigsaw puzzles and Sudoku, are also beneficial to cognitive health  Regular interaction with friends, family and community are also known to be helpful.     Sleep:  Establish a regular, consistent sleep pattern and practice good sleep hygiene.    Avoid screen time (computer, TV, smartphones or tablets) or heavy meals for at least an hour before bedtime.   Avoid caffeine or stimulants after 2 PM.   Exercise earlier in the day or mornings and keep your sleeping environment comfortable. Bedtime and wake-up times should be consistent every night and morning so the body becomes used to a single routine, even on the weekends.   Having a wind down routine (e.g., soft lights in the house, bath before bed, reduced fluid intake, songs, reading, less noise) also helps to promote sleep readiness.   Untreated obstructive sleep apnea can lead to an increased risk for stroke and further  memory loss      STRATEGIES TO COPE WITH YOUR COGNITIVE DEFICITS:  Pay Attention!  Reduce distractions in the area  Look at the person speaking to you & paraphrase what they are saying  Write down important things  Ask them to repeat themselves if you zone out  Ask people to simplify or reduce information if you need to  Processing Speed  Using multiple methods to learn new information, such as listening, taking notes, and recording, can be helpful  Give yourself enough time to complete certain tasks to reduce frustration  Executive Functioning  Don't try to multi-task. Do tasks separately to ensure that each gets completed.   Use a calendar or planner to keep you on track  Write down steps to complicated tasks in case you forget  Storing Information  Immediately after you learn something, try to recall it. Repeat this and gradually lengthen the interval between your recalls  Recalling information   Jog your memory! If you loose something, try to think back to when you last had it. Mentally walk yourself through the steps of your day to prod your memory.   Use clues, timers, memos, notes, etc.  Stay organized - keep your keys in a certain place, put your important papers in a certain place, etc.   Having a routine helps to anchor memories as well        You can follow up with me in 6 months to a year    Please call our clinic at [phone] or send a message on the [facility] portal if there are any changes to the plan discussed today. For example, if you are not contacted for the requested tests, referral(s) within one week, if you are unable to receive the medications prescribed, or if you feel you need to change the treatment course for any reason.

## 2024-08-14 NOTE — ASSESSMENT & PLAN NOTE
Remains stable over time, no functional decline or new symptoms  Suspect normal aging process with contributions from insomnia, untreated LESLIE and possible AC SE   Trial increasing trazodone to 150 mg. Add melatonin, work on sleep hygiene.   Continue efforts to maximize vascular health  No safety concerns

## 2024-08-14 NOTE — PROGRESS NOTES
Caregiver name: Lyn Rapp  Relationship to the patient: self  Does the patient have a living will? No  Does the patient have a designated healthcare POA? Yes  Does the patient have a designated general POA? Yes    Have educational materials/resources been provided? Yes      Activities of Daily Living    Bathing: Independent  Dressing: Independent  Grooming: Independent  Mouth Care: Independent  Toileting: Independent  Transferring Bed/Chair: Independent  Walking: Independent  Climbing: Independent  Eating: Independent      Instrumental Activities of Daily Living    Shopping: Independent  Cooking: Independent  Managing Medications: Independent  Using the phone and looking up numbers: Independent  Doing Housework: Independent  Doing Laundry: Independent  Driving or using public transportation: Independent  Managing finances: Independent    Functional Assessment Staging  1   No difficulty, either subjectively or objectively.             8/14/2024     3:18 PM 4/4/2024     2:21 PM 3/15/2023     1:57 PM 10/11/2022    11:11 AM 3/19/2019     1:17 PM 1/3/2018     5:25 PM 2/27/2017    11:00 AM   Depression Patient Health Questionnaire   Over the last two weeks how often have you been bothered by little interest or pleasure in doing things Several days Not at all Not at all Not at all Several days Not at all Not at all   Over the last two weeks how often have you been bothered by feeling down, depressed or hopeless Several days Not at all Not at all Not at all Several days Not at all Several days   PHQ-2 Total Score 2 0 0 0 2 0 1

## 2024-08-14 NOTE — PROGRESS NOTES
NEUROLOGY  Outpatient Visit     [facility]  [address] [facility], [place] [address]  [phone] (office) / [phone] (fax)    Patient Name:  Lyn Rapp  :  1945  MR #:  [id number]  Acct #:  [id number]    Date of  Visit: 2024    Other Physicians:  Candy Murphy MD (Primary Care Physician)      CHIEF COMPLAINT: Memory Loss        Interval history:  24:  Lyn Rapp is a 79 y.o. R-handed female seen in f/u for memory loss     Medical history is significant for L MCA CVA, anxiety, pAfib, HLD, HTN, CAD, osteopenia, insomnia, LESLIE not on CPAP    Here today alone.     , lives in [place]. She has a Master's degree in English, education. 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     Last visit 2024. MOCA .     She feels that her memory is stable. No new symptoms or concerns. Remains independent.     Some recent depression related to a 3 deaths, but she is coping.     We updated serologies, which showed low B12 / high MMA. Recommended SL supplements, which she is taking.      She has dc Benadryl. She tried trazodone 100 mg without any benefit so has stopped it. She does sleep throughout the day.     She continues ASA and statin for stroke prevention. Recently followed up with Dr. Quintana.     She fell just earlier today while out shopping when her shoes got caught on the carpet. No injuries. She is going to the gym now for exercise.       HISTORY OF PRESENT ILLNESS 24:  Lyn Rapp is a 79 y.o. R-handed female seen in consultation for memory loss per No ref. provider found    Medical history is significant for L MCA CVA, anxiety, pAfib, HLD, HTN, CAD, osteopenia, insomnia, LESLIE not on CPAP    Here today alone. , lives in [place]. She has a Master's degree in English, education. 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     States here today for a baseline evaluation. She is concerned about developing dementia because her sister was  recently diagnosed. If not for that, she wouldn't have any concern about her memory. She is independent with ADLS and iADLs. She drives without difficulty. She manages her medications and finances. She forgets telephone conversations sometimes. She doesn't misplace things. She doesn't have any language issues. She also denies executive dysfunction.     She has trouble falling asleep. She takes Benadryl at night. She has LESLIE, but didn't tolerate CPAP. PCP gave her trazodone, but it didn't help. She only took 50 mg.     She has situational depression. She takes Lexapro and does well overall.     She has not had any hallucinations.     No physical changes, like falls or gait issues. Continent.     Her sister was just diagnosed with dementia. She is 86.     She had 2 CVAs in 2018. This occurred in the setting of stopping her Coumadin, which she was taking due to Afib. She was subsequently transitioned to Eliquis. She has since had an ablation and is off of DOAC per Dr. Quintana. She takes ASA and statin daily. The strokes caused her some vision loss, but it has improved. She sees ophthalmology in [place].     Non smoker. No regular ETOH use.     Allergies:  Review of patient's allergies indicates:   Allergen Reactions    Ciprofloxacin Hives and Swelling     Swelling in throat    Amoxicillin Diarrhea    Bactrim [sulfamethoxazole-trimethoprim] Rash    Sulfur Hives    Toprol xl [metoprolol succinate] Other (See Comments)     Profuse sweating       Current Medications:  Current Outpatient Medications   Medication Sig Dispense Refill    amLODIPine (NORVASC) 2.5 MG tablet Take 1 tablet (2.5 mg total) by mouth once daily. 90 tablet 1    aspirin (ECOTRIN) 81 MG EC tablet Take 1 tablet (81 mg total) by mouth once daily.  0    cholecalciferol, vitamin D3, (VITAMIN D3) 2,000 unit Cap Take 1 capsule by mouth once daily.      cyanocobalamin (VITAMIN B-12) 1000 MCG tablet Take 100 mcg by mouth once daily.      diclofenac sodium (VOLTAREN) 1  % Gel Apply 2 g topically 4 (four) times daily as needed (pain). 150 g 3    EScitalopram oxalate (LEXAPRO) 20 MG tablet Take 1 tablet (20 mg total) by mouth once daily. 90 tablet 1    fexofenadine (ALLEGRA) 180 MG tablet Take 180 mg by mouth daily as needed.       fluticasone (FLONASE) 50 mcg/actuation nasal spray 1 spray by Nasal route as needed.   4    hydroCHLOROthiazide (MICROZIDE) 12.5 mg capsule Take 1 capsule (12.5 mg total) by mouth once daily. 90 capsule 1    latanoprost 0.005 % ophthalmic solution Place into both eyes.      losartan (COZAAR) 100 MG tablet Take 1 tablet (100 mg total) by mouth every evening. 90 tablet 1    MAGNESIUM ORAL Take by mouth every other day. 1 tablespoon daily      pravastatin (PRAVACHOL) 20 MG tablet Take 1 tablet (20 mg total) by mouth once daily. 90 tablet 1    melatonin (MELATIN) 3 mg tablet Take 1 tablet (3 mg total) by mouth nightly.      traZODone (DESYREL) 150 MG tablet Take 1 tablet (150 mg total) by mouth every evening. 90 tablet 1     No current facility-administered medications for this visit.       Past Medical History:  Past Medical History:   Diagnosis Date    Acid reflux     AF (paroxysmal atrial fibrillation)     Carotid arterial disease 4/22/2014    Coronary artery disease due to calcified coronary lesion 7/13/2016    nonobstructive    Diverticulitis 4/29/2018    Diverticulitis 4/29/2018    Embolic stroke involving left middle cerebral artery 4/27/2018    H/O Malignant melanoma 9/12/2011    Hypertension     Melanoma 1994    Melanoma of back     Mitral valve prolapse     Non-ST elevation MI (NSTEMI) 5/23/2018    Obstructive sleep apnea 7/15/2013       Past Surgical History:  Past Surgical History:   Procedure Laterality Date    ABLATION Left 9/16/2019    Procedure: Ablation;  Surgeon: Gaurav Quintana III, MD;  Location: [facility], [facility];  Service: Cardiology;  Laterality: Left;    ABLATION  09/16/2019    heart ablation for afib    APPENDECTOMY      APPENDECTOMY       "BREAST BIOPSY Right 1993    benign excisional biopsy     CARDIAC CATHETERIZATION  2016    CARDIAC ELECTROPHYSIOLOGY STUDY  9/16/2019    Procedure: Study possible ablation;  Surgeon: Gaurav Quintana III, MD;  Location: [facility] CATH;  Service: Cardiology;;    CHOLECYSTECTOMY      COLONOSCOPY      CORONARY ANGIOGRAPHY Left 5/25/2018    Procedure: Coronary angiography-RM # 422 A;  Surgeon: Hilton Underwood III, MD;  [facility] CATH;  Service: Cardiology;  Laterality: Left;    CYSTOSCOPY W/ URETERAL STENT PLACEMENT Right 10/20/2018    Procedure: CYSTOSCOPY, WITH URETERAL STENT INSERTION;  Surgeon: Garry Vázquez MD;  Location: [facility] OR;  Service: Urology;  Laterality: Right;    HYSTERECTOMY  1982    INSERTION OF IMPLANTABLE LOOP RECORDER N/A 5/22/2018    Procedure: Loop insertion-RM # 422 A;  Surgeon: Hilton Underwood III, MD;  Location: [facility] CATH;  Service: Cardiology;  Laterality: N/A;    MANDIBLE SURGERY      OOPHORECTOMY  1978    OOPHORECTOMY  1982    w/Hysterectomy    TONSILLECTOMY      TREATMENT OF CARDIAC ARRHYTHMIA  9/16/2019    Procedure: Cardioversion or Defibrillation;  Surgeon: Gaurav Quintana III, MD;  [facility]ATH;  Service: Cardiology;;    TUBAL LIGATION         Family History:  family history includes Breast cancer in her cousin; Hypertension in her mother; No Known Problems in her father.    Social History:   reports that she has never smoked. She has never used smokeless tobacco. She reports that she does not drink alcohol and does not use drugs.      REVIEW OF SYSTEMS:  As per HPI    PHYSICAL EXAM:  /73 (BP Location: Right arm, Patient Position: Sitting, BP Method: Small (Automatic))   Pulse 69   Ht 5' 5" (1.651 m)   Wt 68.5 kg (150 lb 14.5 oz)   LMP  (LMP Unknown) Comment: complete 1982  BMI 25.11 kg/m²     General: Well groomed. No acute distress.  Cardiovascular: Regular rate and rhythm.   Pulmonary: Normal effort and rate.   Musculoskeletal: arthritic hands.   Extremities: No " clubbing, cyanosis or edema.     Neurological exam:  Mental status: Awake and alert.  Oriented to person, place, time and situation. Recent and remote memory appear to be intact in conversation. MMSE 30/30.   Speech/Language: Fluent and appropriate. No dysarthria or aphasia on conversation. Able to follow complex commands.   Cranial nerves (II-XII): Visual fields full. Pupils equally round, extraocular movements intact, no ptosis, no nystagmus. Facial sensation and symmetry intact bilaterally. Hearing grossly intact. Palate mobile and midline. Shoulder shrug normal bilaterally. Normal tongue protrusion.   Motor: 5 out of 5 strength throughout the upper and lower extremities bilaterally. No abnormal movements noted. No drift appreciated.   Sensation: Intact to light touch.   DTR: Deferred  Coordination: Finger-nose-finger testing intact bilaterally. No tremor.   Gait: Normal gait.     DIAGNOSTIC DATA:  I have personally reviewed provider notes, labs and imaging made available to me today.     Imaging:  MRI brain wo 3/2019:  FINDINGS:  INTRACRANIAL: Large old right MCA territory infarct with associated encephalomalacia underlying gliosis and cortical laminar necrosis as a vault compared to the study from 05/23/2018 on which it was subacute.  Old left frontal operculum infarct and encephalomalacia is unchanged.  Stable scattered T2 FLAIR hyperintense white matter foci are present, likely related to chronic microvascular ischemic change.   No parenchymal restricted diffusion.  No evidence of intracranial hemorrhage.  No extra-axial fluid collection or mass.  No intracranial mass effect.  No hydrocephalus.  Midline structures have a normal configuration.  Visualized pituitary gland and infundibulum are normal.  Visualized major intracranial vascular structures demonstrate normal flow voids and are normal in course and caliber.     SINUSES: Trace bilateral ethmoid and maxillary sinus mucosal thickening.  Mastoid air cells  are clear.     ORBITS: Bilateral lens replacements.  Orbits are otherwise unremarkable.     IMPRESSION:      1. No acute intracranial abnormality.  2. Chronic large right MCA and small left MCA territory infarcts.  Stable chronic microvascular ischemic change.    CUS 5/2018:  IMPRESSION:      No flow limiting stenosis in the bilateral carotids..    CTA head and neck 5/2018:  IMPRESSION:      There is similar symmetric appearance of the neck as well as [id number] vessels.  If there is focal neurologic finding or clinical concern of stroke, MRI would be more sensitive in the evaluation.    Cardiac:  EKG 6/2023:  NSR with 1st degree AVB    Labs:  Lab Results   Component Value Date    WBC 6.10 07/09/2024    HGB 12.7 07/09/2024    HCT 39.5 07/09/2024     07/09/2024    MCV 93 07/09/2024    RDW 12.6 07/09/2024     Lab Results   Component Value Date     07/09/2024    K 3.6 07/09/2024     07/09/2024    CO2 25 07/09/2024    BUN 24 (H) 07/09/2024    CREATININE 1.14 07/09/2024    GLU 92 07/09/2024    CALCIUM 9.4 07/09/2024    MG 2.2 05/21/2018    PHOS 3.1 05/21/2018     Lab Results   Component Value Date    PROT 6.7 07/09/2024    ALBUMIN 4.2 07/09/2024    BILITOT 0.8 07/09/2024    AST 25 07/09/2024    ALKPHOS 47 07/09/2024    ALT 13 07/09/2024     Lab Results   Component Value Date    INR 2.2 05/29/2018     Lab Results   Component Value Date    CHOL 222 (H) 07/09/2024    HDL 76 (H) 07/09/2024    LDLCALC 110.0 07/09/2024    TRIG 180 (H) 07/09/2024    CHOLHDL 34.2 07/09/2024     Lab Results   Component Value Date    HGBA1C 5.6 05/20/2018      Lab Results   Component Value Date    [id number] 599 01/29/2024     Lab Results   Component Value Date    FOLATE 8.6 01/29/2024     Lab Results   Component Value Date    TSH 2.129 01/29/2024         ASSESSMENT & PLAN:  Lyn MOON Dionte is a 79 y.o. R-handed female seen in f/u for memory loss.     Problem List Items Addressed This Visit          Neuro    Memory loss  - Primary    Overview     MOCA: 24/30 Jan 2024  MMSE: 30/30 Aug 2024         Current Assessment & Plan     Remains stable over time, no functional decline or new symptoms  Suspect normal aging process with contributions from insomnia, untreated LESLIE and possible AC SE   Trial increasing trazodone to 150 mg. Add melatonin, work on sleep hygiene.   Continue efforts to maximize vascular health  No safety concerns             Other    LESLIE (obstructive sleep apnea) - unable to tolerate CPAP    Primary insomnia         Follow up: yearly for memory, sooner if needed     I spent a total of 37 minutes on the day of the visit.  This includes face to face time and non-face to face time preparing to see the patient (eg, review of tests), obtaining and/or reviewing separately obtained history, documenting clinical information in the electronic or other health record, independently interpreting results and communicating results to the patient/family/caregiver, or care coordinator.    I appreciate the opportunity to participate in the care of this patient. Please feel free to contact me with any concerns or questions.       Amarilis Villanueva, ACNPC-AG  [facility]  [address] [place] [address]